# Patient Record
Sex: MALE
[De-identification: names, ages, dates, MRNs, and addresses within clinical notes are randomized per-mention and may not be internally consistent; named-entity substitution may affect disease eponyms.]

---

## 2022-07-26 ENCOUNTER — NURSE TRIAGE (OUTPATIENT)
Dept: OTHER | Facility: CLINIC | Age: 47
End: 2022-07-26

## 2022-07-27 NOTE — TELEPHONE ENCOUNTER
Subjective: Caller states \"cough\"     Current Symptoms:   + coughing with some blood, exp to covid  - sob, wheezing    Onset:     1 day; every 5-10 mins    Pain Severity:   Chest tightness 2/10    Temperature:    None     What has been tried:   Ibuprofen     Recommended disposition: See PCP within 24 Hours    Care advice provided, patient verbalizes understanding; denies any other questions or concerns; instructed to call back for any new or worsening symptoms. Patient/caller agrees to follow-up with PCP     This triage is a result of a call to [address] [place] [address]. Please do not respond to the triage nurse through this encounter. Any subsequent communication should be directly with the patient.     Reason for Disposition   [1] COVID-19 infection suspected by caller or triager AND [2] mild symptoms (cough, fever, or others) AND [3] negative COVID-19 rapid test    Protocols used: Coronavirus (COVID-19) Diagnosed or Suspected-ADULT-

## 2023-04-05 DIAGNOSIS — I10 HYPERTENSION, ESSENTIAL, BENIGN: Primary | ICD-10-CM

## 2023-04-05 RX ORDER — LISINOPRIL 40 MG/1
40 TABLET ORAL DAILY
COMMUNITY
End: 2023-04-05 | Stop reason: SDUPTHER

## 2023-04-05 RX ORDER — LISINOPRIL 40 MG/1
40 TABLET ORAL DAILY
Qty: 90 TABLET | Refills: 1 | Status: SHIPPED | OUTPATIENT
Start: 2023-04-05 | End: 2023-07-06 | Stop reason: SDUPTHER

## 2023-05-22 DIAGNOSIS — I10 HYPERTENSION, ESSENTIAL, BENIGN: Primary | ICD-10-CM

## 2023-05-22 RX ORDER — AMLODIPINE BESYLATE 10 MG/1
10 TABLET ORAL DAILY
Qty: 90 TABLET | Refills: 1 | Status: SHIPPED | OUTPATIENT
Start: 2023-05-22 | End: 2023-10-24

## 2023-05-22 RX ORDER — AMLODIPINE BESYLATE 10 MG/1
10 TABLET ORAL DAILY
COMMUNITY
Start: 2023-05-21 | End: 2023-05-22 | Stop reason: SDUPTHER

## 2023-05-25 ENCOUNTER — TELEPHONE (OUTPATIENT)
Dept: PRIMARY CARE | Facility: CLINIC | Age: 48
End: 2023-05-25
Payer: COMMERCIAL

## 2023-05-25 NOTE — TELEPHONE ENCOUNTER
Pt scheduled. Pt would also like to know if Dr. Jaquez can do anything about his cold symptoms that he has now or recommend what he should do. Pt prefers [facility] in [facility].

## 2023-05-25 NOTE — TELEPHONE ENCOUNTER
Pt states that he is sick with flu like symptoms. He is requesting to change his appointment with Dr. Jaquez to a phone appointment instead. He is available to do this today or tomorrow. Please advise.   [phone]

## 2023-05-26 ENCOUNTER — APPOINTMENT (OUTPATIENT)
Dept: PRIMARY CARE | Facility: CLINIC | Age: 48
End: 2023-05-26
Payer: COMMERCIAL

## 2023-06-22 ENCOUNTER — LAB (OUTPATIENT)
Dept: LAB | Facility: LAB | Age: 48
End: 2023-06-22
Payer: COMMERCIAL

## 2023-06-22 ENCOUNTER — OFFICE VISIT (OUTPATIENT)
Dept: PRIMARY CARE | Facility: CLINIC | Age: 48
End: 2023-06-22
Payer: COMMERCIAL

## 2023-06-22 VITALS
DIASTOLIC BLOOD PRESSURE: 78 MMHG | HEART RATE: 63 BPM | OXYGEN SATURATION: 98 % | SYSTOLIC BLOOD PRESSURE: 124 MMHG | HEIGHT: 71 IN | BODY MASS INDEX: 40.04 KG/M2 | WEIGHT: 286 LBS | TEMPERATURE: 97.3 F | RESPIRATION RATE: 18 BRPM

## 2023-06-22 DIAGNOSIS — R68.82 LOW LIBIDO: ICD-10-CM

## 2023-06-22 DIAGNOSIS — Z00.00 HEALTH CARE MAINTENANCE: Primary | ICD-10-CM

## 2023-06-22 DIAGNOSIS — E55.9 VITAMIN D DEFICIENCY: ICD-10-CM

## 2023-06-22 DIAGNOSIS — I10 HYPERTENSION, ESSENTIAL, BENIGN: ICD-10-CM

## 2023-06-22 DIAGNOSIS — R53.83 FATIGUE, UNSPECIFIED TYPE: ICD-10-CM

## 2023-06-22 DIAGNOSIS — E66.01 CLASS 2 SEVERE OBESITY DUE TO EXCESS CALORIES WITH SERIOUS COMORBIDITY AND BODY MASS INDEX (BMI) OF 39.0 TO 39.9 IN ADULT (MULTI): ICD-10-CM

## 2023-06-22 DIAGNOSIS — Z00.00 HEALTH CARE MAINTENANCE: ICD-10-CM

## 2023-06-22 DIAGNOSIS — G47.33 OBSTRUCTIVE SLEEP APNEA SYNDROME: ICD-10-CM

## 2023-06-22 DIAGNOSIS — K21.9 GASTROESOPHAGEAL REFLUX DISEASE, UNSPECIFIED WHETHER ESOPHAGITIS PRESENT: ICD-10-CM

## 2023-06-22 PROBLEM — E78.5 HYPERLIPIDEMIA: Status: ACTIVE | Noted: 2023-06-22

## 2023-06-22 PROBLEM — J45.20 MILD INTERMITTENT ASTHMA WITHOUT COMPLICATION (HHS-HCC): Status: ACTIVE | Noted: 2017-09-06

## 2023-06-22 PROBLEM — E78.1 HYPERTRIGLYCERIDEMIA: Status: ACTIVE | Noted: 2023-06-22

## 2023-06-22 PROBLEM — E66.812 CLASS 2 SEVERE OBESITY DUE TO EXCESS CALORIES WITH SERIOUS COMORBIDITY AND BODY MASS INDEX (BMI) OF 39.0 TO 39.9 IN ADULT: Status: ACTIVE | Noted: 2023-06-22

## 2023-06-22 LAB
ALANINE AMINOTRANSFERASE (SGPT) (U/L) IN SER/PLAS: 20 U/L (ref 10–52)
ALBUMIN (G/DL) IN SER/PLAS: 4.4 G/DL (ref 3.4–5)
ALKALINE PHOSPHATASE (U/L) IN SER/PLAS: 53 U/L (ref 33–120)
ANION GAP IN SER/PLAS: 13 MMOL/L (ref 10–20)
ASPARTATE AMINOTRANSFERASE (SGOT) (U/L) IN SER/PLAS: 19 U/L (ref 9–39)
BASOPHILS (10*3/UL) IN BLOOD BY AUTOMATED COUNT: 0.05 X10E9/L (ref 0–0.1)
BASOPHILS/100 LEUKOCYTES IN BLOOD BY AUTOMATED COUNT: 0.8 % (ref 0–2)
BILIRUBIN TOTAL (MG/DL) IN SER/PLAS: 0.5 MG/DL (ref 0–1.2)
CALCIDIOL (25 OH VITAMIN D3) (NG/ML) IN SER/PLAS: 50 NG/ML
CALCIUM (MG/DL) IN SER/PLAS: 9.4 MG/DL (ref 8.6–10.3)
CARBON DIOXIDE, TOTAL (MMOL/L) IN SER/PLAS: 26 MMOL/L (ref 21–32)
CHLORIDE (MMOL/L) IN SER/PLAS: 107 MMOL/L (ref 98–107)
CHOLESTEROL (MG/DL) IN SER/PLAS: 208 MG/DL (ref 0–199)
CHOLESTEROL IN HDL (MG/DL) IN SER/PLAS: 49.5 MG/DL
CHOLESTEROL/HDL RATIO: 4.2
CREATININE (MG/DL) IN SER/PLAS: 0.98 MG/DL (ref 0.5–1.3)
EOSINOPHILS (10*3/UL) IN BLOOD BY AUTOMATED COUNT: 0.11 X10E9/L (ref 0–0.7)
EOSINOPHILS/100 LEUKOCYTES IN BLOOD BY AUTOMATED COUNT: 1.8 % (ref 0–6)
ERYTHROCYTE DISTRIBUTION WIDTH (RATIO) BY AUTOMATED COUNT: 13.1 % (ref 11.5–14.5)
ERYTHROCYTE MEAN CORPUSCULAR HEMOGLOBIN CONCENTRATION (G/DL) BY AUTOMATED: 32.5 G/DL (ref 32–36)
ERYTHROCYTE MEAN CORPUSCULAR VOLUME (FL) BY AUTOMATED COUNT: 83 FL (ref 80–100)
ERYTHROCYTES (10*6/UL) IN BLOOD BY AUTOMATED COUNT: 5.2 X10E12/L (ref 4.5–5.9)
GFR MALE: >90 ML/MIN/1.73M2
GLUCOSE (MG/DL) IN SER/PLAS: 92 MG/DL (ref 74–99)
HEMATOCRIT (%) IN BLOOD BY AUTOMATED COUNT: 43.1 % (ref 41–52)
HEMOGLOBIN (G/DL) IN BLOOD: 14 G/DL (ref 13.5–17.5)
IMMATURE GRANULOCYTES/100 LEUKOCYTES IN BLOOD BY AUTOMATED COUNT: 0.2 % (ref 0–0.9)
LDL: 136 MG/DL (ref 0–99)
LEUKOCYTES (10*3/UL) IN BLOOD BY AUTOMATED COUNT: 6.1 X10E9/L (ref 4.4–11.3)
LYMPHOCYTES (10*3/UL) IN BLOOD BY AUTOMATED COUNT: 1.53 X10E9/L (ref 1.2–4.8)
LYMPHOCYTES/100 LEUKOCYTES IN BLOOD BY AUTOMATED COUNT: 25.1 % (ref 13–44)
MONOCYTES (10*3/UL) IN BLOOD BY AUTOMATED COUNT: 0.56 X10E9/L (ref 0.1–1)
MONOCYTES/100 LEUKOCYTES IN BLOOD BY AUTOMATED COUNT: 9.2 % (ref 2–10)
NEUTROPHILS (10*3/UL) IN BLOOD BY AUTOMATED COUNT: 3.84 X10E9/L (ref 1.2–7.7)
NEUTROPHILS/100 LEUKOCYTES IN BLOOD BY AUTOMATED COUNT: 62.9 % (ref 40–80)
PLATELETS (10*3/UL) IN BLOOD AUTOMATED COUNT: 264 X10E9/L (ref 150–450)
POC APPEARANCE, URINE: CLEAR
POC BILIRUBIN, URINE: NEGATIVE
POC BLOOD, URINE: NEGATIVE
POC COLOR, URINE: YELLOW
POC GLUCOSE, URINE: NEGATIVE MG/DL
POC KETONES, URINE: NEGATIVE MG/DL
POC LEUKOCYTES, URINE: NEGATIVE
POC NITRITE,URINE: NEGATIVE
POC PH, URINE: 6.5 PH
POC PROTEIN, URINE: NEGATIVE MG/DL
POC SPECIFIC GRAVITY, URINE: 1.01
POC UROBILINOGEN, URINE: 0.2 EU/DL
POTASSIUM (MMOL/L) IN SER/PLAS: 4.2 MMOL/L (ref 3.5–5.3)
PROTEIN TOTAL: 6.5 G/DL (ref 6.4–8.2)
SODIUM (MMOL/L) IN SER/PLAS: 142 MMOL/L (ref 136–145)
TESTOSTERONE (NG/DL) IN SER/PLAS: 260 NG/DL (ref 240–1000)
THYROTROPIN (MIU/L) IN SER/PLAS BY DETECTION LIMIT <= 0.05 MIU/L: 2 MIU/L (ref 0.44–3.98)
TRIGLYCERIDE (MG/DL) IN SER/PLAS: 111 MG/DL (ref 0–149)
UREA NITROGEN (MG/DL) IN SER/PLAS: 17 MG/DL (ref 6–23)
VLDL: 22 MG/DL (ref 0–40)

## 2023-06-22 PROCEDURE — 1036F TOBACCO NON-USER: CPT | Performed by: FAMILY MEDICINE

## 2023-06-22 PROCEDURE — 83036 HEMOGLOBIN GLYCOSYLATED A1C: CPT

## 2023-06-22 PROCEDURE — 3008F BODY MASS INDEX DOCD: CPT | Performed by: FAMILY MEDICINE

## 2023-06-22 PROCEDURE — 84443 ASSAY THYROID STIM HORMONE: CPT

## 2023-06-22 PROCEDURE — 36415 COLL VENOUS BLD VENIPUNCTURE: CPT

## 2023-06-22 PROCEDURE — 93000 ELECTROCARDIOGRAM COMPLETE: CPT | Performed by: FAMILY MEDICINE

## 2023-06-22 PROCEDURE — 84403 ASSAY OF TOTAL TESTOSTERONE: CPT

## 2023-06-22 PROCEDURE — 3078F DIAST BP <80 MM HG: CPT | Performed by: FAMILY MEDICINE

## 2023-06-22 PROCEDURE — 99396 PREV VISIT EST AGE 40-64: CPT | Performed by: FAMILY MEDICINE

## 2023-06-22 PROCEDURE — 80061 LIPID PANEL: CPT

## 2023-06-22 PROCEDURE — 81002 URINALYSIS NONAUTO W/O SCOPE: CPT | Performed by: FAMILY MEDICINE

## 2023-06-22 PROCEDURE — 80053 COMPREHEN METABOLIC PANEL: CPT

## 2023-06-22 PROCEDURE — 3074F SYST BP LT 130 MM HG: CPT | Performed by: FAMILY MEDICINE

## 2023-06-22 PROCEDURE — 85025 COMPLETE CBC W/AUTO DIFF WBC: CPT

## 2023-06-22 PROCEDURE — 82306 VITAMIN D 25 HYDROXY: CPT

## 2023-06-22 RX ORDER — ALBUTEROL SULFATE 90 UG/1
AEROSOL, METERED RESPIRATORY (INHALATION)
COMMUNITY
Start: 2022-07-27

## 2023-06-22 RX ORDER — FOLIC ACID 0.8 MG
1 TABLET ORAL DAILY
COMMUNITY

## 2023-06-22 RX ORDER — OMEPRAZOLE 20 MG/1
CAPSULE, DELAYED RELEASE ORAL
COMMUNITY
Start: 2017-06-11 | End: 2023-10-24

## 2023-06-22 RX ORDER — CHOLECALCIFEROL (VITAMIN D3) 50 MCG
2 TABLET ORAL DAILY
COMMUNITY
Start: 2020-05-11

## 2023-06-22 ASSESSMENT — PATIENT HEALTH QUESTIONNAIRE - PHQ9
10. IF YOU CHECKED OFF ANY PROBLEMS, HOW DIFFICULT HAVE THESE PROBLEMS MADE IT FOR YOU TO DO YOUR WORK, TAKE CARE OF THINGS AT HOME, OR GET ALONG WITH OTHER PEOPLE: NOT DIFFICULT AT ALL
SUM OF ALL RESPONSES TO PHQ9 QUESTIONS 1 AND 2: 1
2. FEELING DOWN, DEPRESSED OR HOPELESS: SEVERAL DAYS
1. LITTLE INTEREST OR PLEASURE IN DOING THINGS: NOT AT ALL

## 2023-06-22 ASSESSMENT — ENCOUNTER SYMPTOMS
SHORTNESS OF BREATH: 0
HEADACHES: 0
DEPRESSION: 0
LOSS OF SENSATION IN FEET: 0
OCCASIONAL FEELINGS OF UNSTEADINESS: 0

## 2023-06-22 NOTE — PROGRESS NOTES
"Subjective     Darwin Frost is a 47 y.o. male who presents for Annual Exam.    HPI     Pt is doing well.  He is here for his annual exam.  He is physically active.     He has HTN , on amlodipine and lisinopril.      He has RAJAT , on cpap.      He has GERD, well controlled on PPI daily.     Review of Systems   Respiratory:  Negative for shortness of breath.    Cardiovascular:  Negative for chest pain.   Neurological:  Negative for headaches.       Objective     Vitals:    06/22/23 0858   BP: 124/78   BP Location: Left arm   Patient Position: Sitting   Pulse: 63   Resp: 18   Temp: 36.3 °C (97.3 °F)   TempSrc: Temporal   SpO2: 98%   Weight: 130 kg (286 lb)   Height: 1.803 m (5' 11\")        Current Outpatient Medications   Medication Instructions    albuterol 90 mcg/actuation inhaler inhalation    amLODIPine (NORVASC) 10 mg, oral, Daily    cholecalciferol (Vitamin D-3) 50 MCG (2000 UT) tablet 2 tablets, oral, Daily    folic acid (Folvite) 800 mcg tablet 1 tablet, oral, Daily    lisinopril 40 mg, oral, Daily    omeprazole (PriLOSEC) 20 mg DR capsule         Past Surgical History:   Procedure Laterality Date    OTHER SURGICAL HISTORY  05/28/2021    Vasectomy    OTHER SURGICAL HISTORY  05/24/2022    Colonoscopy        Social History     Tobacco Use    Smoking status: Never    Smokeless tobacco: Never   Vaping Use    Vaping Use: Never used        No family history on file.     Immunization History   Administered Date(s) Administered    DTP / HiB 01/01/1976    Hep B, adult 11/18/2014, 05/26/2015    MMR 10/01/1976, 03/01/1977    Moderna SARS-CoV-2 Vaccination 03/24/2021, 04/29/2021, 01/03/2022    Pfizer Sars-cov-2 Bivalent 30 mcg/0.3 mL 12/01/2022    Pneumococcal Conjugate PCV 13 11/18/2014    TD (adult), 2 Lf tetanus toxoid, preservative free, adsorbed 01/01/1980    Tdap 05/03/2007, 11/18/2014, 05/11/2020    Varicella 12/01/1976        Physical Exam  Vitals reviewed.   Constitutional:       General: He is not in acute " distress.     Appearance: Normal appearance. He is obese.   HENT:      Head: Normocephalic and atraumatic.      Right Ear: Tympanic membrane, ear canal and external ear normal.      Left Ear: Tympanic membrane, ear canal and external ear normal.      Nose: Nose normal.      Mouth/Throat:      Mouth: Mucous membranes are moist.      Pharynx: Oropharynx is clear. No oropharyngeal exudate or posterior oropharyngeal erythema.   Eyes:      Extraocular Movements: Extraocular movements intact.      Pupils: Pupils are equal, round, and reactive to light.   Neck:      Thyroid: No thyroid mass or thyromegaly.   Cardiovascular:      Rate and Rhythm: Normal rate and regular rhythm.      Heart sounds: No murmur heard.  Pulmonary:      Effort: Pulmonary effort is normal. No respiratory distress.      Breath sounds: Normal breath sounds. No wheezing, rhonchi or rales.   Abdominal:      General: Abdomen is flat. There is no distension.      Palpations: Abdomen is soft.      Tenderness: There is no abdominal tenderness.   Genitourinary:     Testes: Normal.   Musculoskeletal:         General: Normal range of motion.   Lymphadenopathy:      Cervical: No cervical adenopathy.   Skin:     General: Skin is warm and dry.      Findings: No rash.   Neurological:      General: No focal deficit present.      Mental Status: He is alert and oriented to person, place, and time. Mental status is at baseline.   Psychiatric:         Mood and Affect: Mood normal.         Behavior: Behavior normal.         Problem List Items Addressed This Visit       Health care maintenance - Primary    Relevant Orders    POCT UA (nonautomated w/o microscopy) manually resulted (Completed)    ECG 12 lead (Clinic Performed) (Completed)    Comprehensive Metabolic Panel    Lipid Panel    CBC and Auto Differential    Hemoglobin A1C    Hypertension, essential, benign    Vitamin D deficiency    Relevant Orders    Vitamin D, Total    Obstructive sleep apnea syndrome    GERD  (gastroesophageal reflux disease)    Class 2 severe obesity due to excess calories with serious comorbidity and body mass index (BMI) of 39.0 to 39.9 in adult (CMS/[facility])     Other Visit Diagnoses       Low libido        Relevant Orders    Testosterone    TSH with reflex to Free T4 if abnormal    Fatigue, unspecified type        Relevant Orders    Testosterone    TSH with reflex to Free T4 if abnormal            Assessment/Plan     Well Exam     Colon screening - utd with colonoscopy 9/20/21-repeat 5 years     Vaccines - utd with tdap.    Hx of recurrent pneumonia - had prevnar 13 vaccine in 2014.  No recent bouts of pneumonia.      I recommend yearly flu vaccine and routine COVID vaccinations as indicated     Complete labs    Obesity- Healthy diet, routine exercise was discussed with patient  - pt declined referral to dietitian.    HTN - controlled    RAJAT - on cpap nightly    Fatigue, low libido, check testosterone, TSH.    Follow up in 4 months for recheck on HTN, weight.

## 2023-06-23 LAB
ESTIMATED AVERAGE GLUCOSE FOR HBA1C: 111 MG/DL
HEMOGLOBIN A1C/HEMOGLOBIN TOTAL IN BLOOD: 5.5 %

## 2023-06-27 DIAGNOSIS — E78.5 HYPERLIPIDEMIA, MILD: Primary | ICD-10-CM

## 2023-06-27 DIAGNOSIS — R68.82 LOW LIBIDO: Primary | ICD-10-CM

## 2023-06-28 DIAGNOSIS — R79.89 LOW TESTOSTERONE: Primary | ICD-10-CM

## 2023-07-06 DIAGNOSIS — I10 HYPERTENSION, ESSENTIAL, BENIGN: ICD-10-CM

## 2023-07-06 RX ORDER — LISINOPRIL 40 MG/1
40 TABLET ORAL DAILY
Qty: 90 TABLET | Refills: 3 | Status: SHIPPED | OUTPATIENT
Start: 2023-07-06

## 2023-08-11 LAB
ERYTHROCYTE DISTRIBUTION WIDTH (RATIO) BY AUTOMATED COUNT: 13 % (ref 11.5–14.5)
ERYTHROCYTE MEAN CORPUSCULAR HEMOGLOBIN CONCENTRATION (G/DL) BY AUTOMATED: 32.1 G/DL (ref 32–36)
ERYTHROCYTE MEAN CORPUSCULAR VOLUME (FL) BY AUTOMATED COUNT: 84 FL (ref 80–100)
ERYTHROCYTES (10*6/UL) IN BLOOD BY AUTOMATED COUNT: 4.94 X10E12/L (ref 4.5–5.9)
ESTRADIOL (PG/ML) IN SER/PLAS: 22 PG/ML
HEMATOCRIT (%) IN BLOOD BY AUTOMATED COUNT: 41.4 % (ref 41–52)
HEMOGLOBIN (G/DL) IN BLOOD: 13.3 G/DL (ref 13.5–17.5)
LEUKOCYTES (10*3/UL) IN BLOOD BY AUTOMATED COUNT: 5.8 X10E9/L (ref 4.4–11.3)
LUTEINIZING HORMONE (IU/ML) IN SER/PLAS: 2.6 IU/L
PLATELETS (10*3/UL) IN BLOOD AUTOMATED COUNT: 240 X10E9/L (ref 150–450)
PROLACTIN (UG/L) IN SER/PLAS: 11.1 UG/L (ref 2–18)
PROSTATE SPECIFIC AG (NG/ML) IN SER/PLAS: 0.66 NG/ML (ref 0–4)
TESTOSTERONE (NG/DL) IN SER/PLAS: 247 NG/DL (ref 240–1000)

## 2023-09-29 PROBLEM — G47.33 OSA ON CPAP: Status: ACTIVE | Noted: 2023-09-29

## 2023-09-29 PROBLEM — Q25.79 PULMONARY ARTERY ABNORMALITY (HHS-HCC): Status: ACTIVE | Noted: 2023-09-29

## 2023-09-29 PROBLEM — L08.9 INFECTED SEBACEOUS CYST: Status: ACTIVE | Noted: 2023-09-29

## 2023-09-29 PROBLEM — R91.8 ABNORMAL CT LUNG SCREENING: Status: ACTIVE | Noted: 2023-09-29

## 2023-09-29 PROBLEM — L72.3 INFECTED SEBACEOUS CYST: Status: ACTIVE | Noted: 2023-09-29

## 2023-09-29 PROBLEM — E29.1 HYPOGONADISM IN MALE: Status: ACTIVE | Noted: 2023-09-29

## 2023-09-29 PROBLEM — R35.89 POLYURIA: Status: ACTIVE | Noted: 2023-09-29

## 2023-09-29 PROBLEM — R07.89 ATYPICAL CHEST PAIN: Status: ACTIVE | Noted: 2023-09-29

## 2023-09-29 LAB — TESTOSTERONE (NG/DL) IN SER/PLAS: 719 NG/DL (ref 240–1000)

## 2023-09-29 RX ORDER — OMEPRAZOLE 20 MG/1
1 TABLET, ORALLY DISINTEGRATING, DELAYED RELEASE ORAL DAILY
COMMUNITY
Start: 2020-05-11 | End: 2024-04-15 | Stop reason: SDUPTHER

## 2023-09-29 RX ORDER — TESTOSTERONE CYPIONATE 200 MG/ML
0.5 INJECTION, SOLUTION INTRAMUSCULAR
COMMUNITY
End: 2023-10-05 | Stop reason: SDUPTHER

## 2023-09-29 RX ORDER — AMLODIPINE BESYLATE 5 MG/1
5 TABLET ORAL DAILY
COMMUNITY
Start: 2022-11-29 | End: 2023-10-24

## 2023-10-05 ENCOUNTER — OFFICE VISIT (OUTPATIENT)
Dept: UROLOGY | Facility: CLINIC | Age: 48
End: 2023-10-05
Payer: COMMERCIAL

## 2023-10-05 VITALS
DIASTOLIC BLOOD PRESSURE: 81 MMHG | SYSTOLIC BLOOD PRESSURE: 145 MMHG | HEART RATE: 65 BPM | BODY MASS INDEX: 38.74 KG/M2 | WEIGHT: 286 LBS | HEIGHT: 72 IN

## 2023-10-05 DIAGNOSIS — E29.1 HYPOGONADISM IN MALE: Primary | ICD-10-CM

## 2023-10-05 DIAGNOSIS — E29.1 TESTOSTERONE DEFICIENCY IN MALE: ICD-10-CM

## 2023-10-05 DIAGNOSIS — E29.1 HYPOGONADISM IN MALE: ICD-10-CM

## 2023-10-05 PROCEDURE — 3008F BODY MASS INDEX DOCD: CPT | Performed by: UROLOGY

## 2023-10-05 PROCEDURE — 3077F SYST BP >= 140 MM HG: CPT | Performed by: UROLOGY

## 2023-10-05 PROCEDURE — 99214 OFFICE O/P EST MOD 30 MIN: CPT | Performed by: UROLOGY

## 2023-10-05 PROCEDURE — 3079F DIAST BP 80-89 MM HG: CPT | Performed by: UROLOGY

## 2023-10-05 PROCEDURE — 1036F TOBACCO NON-USER: CPT | Performed by: UROLOGY

## 2023-10-05 RX ORDER — TESTOSTERONE CYPIONATE 200 MG/ML
100 INJECTION, SOLUTION INTRAMUSCULAR
Qty: 10 ML | Refills: 3 | Status: SHIPPED | OUTPATIENT
Start: 2023-10-05 | End: 2024-01-18 | Stop reason: SDUPTHER

## 2023-10-05 NOTE — PROGRESS NOTES
"PRIOR NOTES  48-year-old male referred for evaluation of low testosterone  PMH: RAJAT on CPAP, hypertension, hyperlipidemia  6/22/2023-testosterone at 9:38  ng/dL  Hemoglobin 14  Pt endorses: Depression, excessive fatigue  Sexual: Good erectile function, libido is okay, \"lowish\"  Non-sexual: fatigue, depression. Having difficulty w/ weight loss and muscle gain despite weightlifting and exercising x 1 yr  Using CPAP  Prior vasectomy  No prior chemotherapy, mumps, frequent hot tub use  No recreational drug use, or opioid use    UPDATED SUBJECTIVE HISTORY  10/05/23 - Pt reports better stamina, recovery, focus. No adverse affects he is aware of. Injecting thighs.     T 9/29/23 - 719    PSA 8/11/23 - 0.66  LH 2.6  T 8/11/23 - 247  E2 - 22    Past Medical History  He has a past medical history of Essential (primary) hypertension.    Surgical History  He has a past surgical history that includes Other surgical history (05/28/2021) and Other surgical history (05/24/2022).     Social History  He reports that he has never smoked. He has never used smokeless tobacco. No history on file for alcohol use and drug use.    Family History  Family History   Problem Relation Name Age of Onset    Heart murmur Father      Colon cancer Paternal Grandmother          Allergies  Patient has no known allergies.    ROS: 12 system review was completed and is negative with the exception of those signs and symptoms noted in the history of present illness: A 12 system review was completed and is negative with the exception of those signs and symptoms noted in the history of present illness.     Exam:  General: in NAD, appears stated age  Head: normocephalic, atraumatic  Respiratory: normal effort, no use of accessory muscles  Cardiovascular: no edema noted  Skin: normal turgor, no rashes  Neurologic: grossly intact, oriented to person/place/time  Psychiatric: mode and affect appropriate     Last Recorded Vitals  Blood pressure 145/81, pulse " 65, height 1.829 m (6'), weight 130 kg (286 lb).    Lab Results   Component Value Date    CREATININE 0.98 06/22/2023    HGB 13.3 (L) 08/11/2023         ASSESSMENT/PLAN:  #Hypogonadism  -Excellent subjective response thus far to testosterone cypionate 100 weekly  -Excellent lab results as well, testosterone is in range  -Repeat testosterone and CBC in 3 months    Curry Natarajan MD

## 2023-10-24 DIAGNOSIS — I10 HYPERTENSION, ESSENTIAL, BENIGN: Primary | ICD-10-CM

## 2023-10-24 RX ORDER — METOPROLOL SUCCINATE 25 MG/1
25 TABLET, EXTENDED RELEASE ORAL DAILY
Qty: 30 TABLET | Refills: 1 | Status: SHIPPED | OUTPATIENT
Start: 2023-10-24 | End: 2023-11-09 | Stop reason: DRUGHIGH

## 2023-11-08 ENCOUNTER — PATIENT MESSAGE (OUTPATIENT)
Dept: PRIMARY CARE | Facility: CLINIC | Age: 48
End: 2023-11-08
Payer: COMMERCIAL

## 2023-11-09 DIAGNOSIS — I10 HYPERTENSION, ESSENTIAL, BENIGN: ICD-10-CM

## 2023-11-09 RX ORDER — METOPROLOL SUCCINATE 25 MG/1
50 TABLET, EXTENDED RELEASE ORAL DAILY
Qty: 60 TABLET | Refills: 1 | Status: SHIPPED | COMMUNITY
Start: 2023-11-09 | End: 2023-11-13 | Stop reason: SDUPTHER

## 2023-11-13 DIAGNOSIS — I10 HYPERTENSION, ESSENTIAL, BENIGN: ICD-10-CM

## 2023-11-13 RX ORDER — METOPROLOL SUCCINATE 25 MG/1
50 TABLET, EXTENDED RELEASE ORAL DAILY
Qty: 60 TABLET | Refills: 2 | Status: SHIPPED | OUTPATIENT
Start: 2023-11-13 | End: 2023-12-11

## 2023-11-20 DIAGNOSIS — I10 HYPERTENSION, ESSENTIAL, BENIGN: Primary | ICD-10-CM

## 2023-11-20 RX ORDER — HYDROCHLOROTHIAZIDE 25 MG/1
25 TABLET ORAL DAILY
Qty: 30 TABLET | Refills: 1 | Status: SHIPPED | OUTPATIENT
Start: 2023-11-20 | End: 2023-12-14

## 2023-11-29 ENCOUNTER — OFFICE VISIT (OUTPATIENT)
Dept: PRIMARY CARE | Facility: CLINIC | Age: 48
End: 2023-11-29
Payer: COMMERCIAL

## 2023-11-29 VITALS
TEMPERATURE: 98.2 F | RESPIRATION RATE: 16 BRPM | SYSTOLIC BLOOD PRESSURE: 138 MMHG | HEIGHT: 71 IN | OXYGEN SATURATION: 97 % | BODY MASS INDEX: 39.7 KG/M2 | DIASTOLIC BLOOD PRESSURE: 86 MMHG | WEIGHT: 283.6 LBS | HEART RATE: 60 BPM

## 2023-11-29 DIAGNOSIS — I10 HYPERTENSION, UNCONTROLLED: ICD-10-CM

## 2023-11-29 DIAGNOSIS — I10 HYPERTENSION, ESSENTIAL, BENIGN: Primary | ICD-10-CM

## 2023-11-29 PROBLEM — Q25.79 PULMONARY ARTERY ABNORMALITY (HHS-HCC): Status: RESOLVED | Noted: 2023-09-29 | Resolved: 2023-11-29

## 2023-11-29 PROCEDURE — 3008F BODY MASS INDEX DOCD: CPT | Performed by: FAMILY MEDICINE

## 2023-11-29 PROCEDURE — 99214 OFFICE O/P EST MOD 30 MIN: CPT | Performed by: FAMILY MEDICINE

## 2023-11-29 PROCEDURE — 1036F TOBACCO NON-USER: CPT | Performed by: FAMILY MEDICINE

## 2023-11-29 PROCEDURE — 3075F SYST BP GE 130 - 139MM HG: CPT | Performed by: FAMILY MEDICINE

## 2023-11-29 PROCEDURE — 3079F DIAST BP 80-89 MM HG: CPT | Performed by: FAMILY MEDICINE

## 2023-11-29 RX ORDER — AMLODIPINE BESYLATE 5 MG/1
5 TABLET ORAL DAILY
Qty: 30 TABLET | Refills: 2 | Status: SHIPPED | OUTPATIENT
Start: 2023-11-29 | End: 2023-12-18

## 2023-11-29 NOTE — PROGRESS NOTES
"Subjective     Darwin Frost is a 48 y.o. male who presents for Follow-up (Medication.).    HPI     He is here today to follow up on his HTN.  He is on currently taking hydrochlorothiazide 25 mg daily (started on 11/22/23), metoprolol 25 mg TWO tabs daily, lisinopril 40 mg daily.  He was unable to tolerate amlodipine due to ankle swelling.  His recent home bp readings have been in the 130-150s systolic.  He reports his ankle edema has helped.     Review of Systems   Respiratory:  Negative for shortness of breath.    Cardiovascular:  Negative for chest pain.   Neurological:  Negative for headaches.       Objective     Vitals:    11/29/23 1523 11/29/23 1533 11/29/23 1620 11/29/23 1625   BP: (!) 170/98 (!) 163/97 142/88 138/86   BP Location: Left arm Left arm     Patient Position: Sitting Sitting     Pulse: 60      Resp: 16      Temp: 36.8 °C (98.2 °F)      SpO2: 97%      Weight: 129 kg (283 lb 9.6 oz)      Height: 1.803 m (5' 11\")           Current Outpatient Medications   Medication Instructions    albuterol 90 mcg/actuation inhaler inhalation    amLODIPine (NORVASC) 5 mg, oral, Daily    cholecalciferol (Vitamin D-3) 50 MCG (2000 UT) tablet 2 tablets, oral, Daily    docosahexaenoic acid/epa (FISH OIL ORAL) oral    folic acid (Folvite) 800 mcg tablet 1 tablet, oral, Daily    hydroCHLOROthiazide (HYDRODIURIL) 25 mg, oral, Daily    lisinopril 40 mg, oral, Daily    metoprolol succinate XL (TOPROL-XL) 50 mg, oral, Daily, Do not crush or chew.    omeprazole 20 mg tablet,disintegrat, delay rel 1 tablet, oral, Daily    testosterone cypionate (DEPO-TESTOSTERONE) 100 mg, intramuscular, Weekly        Past Surgical History:   Procedure Laterality Date    OTHER SURGICAL HISTORY  05/28/2021    Vasectomy    OTHER SURGICAL HISTORY  05/24/2022    Colonoscopy        Social History     Tobacco Use    Smoking status: Never    Smokeless tobacco: Never   Vaping Use    Vaping Use: Never used        Family History   Problem Relation Name Age " of Onset    Heart murmur Father      Colon cancer Paternal Grandmother          Immunization History   Administered Date(s) Administered    DTP / HiB 01/01/1976    Flu vaccine (IIV4), preservative free *Check age/dose* 11/18/2020, 10/30/2021, 11/01/2022    Hepatitis B vaccine, adult (RECOMBIVAX, ENGERIX) 11/18/2014, 05/26/2015    Influenza, seasonal, injectable 11/18/2014    MMR vaccine, subcutaneous (MMR II) 10/01/1976, 03/01/1977    Moderna SARS-CoV-2 Vaccination 03/24/2021, 04/29/2021, 01/03/2022    Pfizer COVID-19 vaccine, bivalent, age 12 years and older (30 mcg/0.3 mL) 12/01/2022    Pneumococcal conjugate vaccine, 13-valent (PREVNAR 13) 11/18/2014    Td vaccine, age 7 years and older (TDVAX) 01/01/1980    Tdap vaccine, age 7 year and older (BOOSTRIX) 05/03/2007, 11/18/2014, 05/11/2020    Varicella vaccine, subcutaneous (VARIVAX) 12/01/1976        Physical Exam  Vitals reviewed.   Constitutional:       General: He is not in acute distress.     Appearance: Normal appearance. He is well-developed. He is obese.   HENT:      Head: Normocephalic and atraumatic.   Eyes:      General: Lids are normal.      Conjunctiva/sclera:      Right eye: Right conjunctiva is not injected.      Left eye: Left conjunctiva is not injected.   Cardiovascular:      Rate and Rhythm: Normal rate and regular rhythm.      Heart sounds: No murmur heard.  Pulmonary:      Effort: Pulmonary effort is normal. No respiratory distress.      Breath sounds: Normal breath sounds. No wheezing, rhonchi or rales.   Skin:     General: Skin is warm and dry.      Findings: No rash.   Neurological:      Mental Status: He is alert and oriented to person, place, and time. Mental status is at baseline.   Psychiatric:         Mood and Affect: Mood normal.         Behavior: Behavior normal.         Problem List Items Addressed This Visit       Hypertension, essential, benign - Primary    Relevant Medications    amLODIPine (Norvasc) 5 mg tablet     Other Visit  Diagnoses       Hypertension, uncontrolled        Relevant Orders    Vascular UsSRenal Artery Duplex Complete    US renal complete            Assessment/Plan     Hypertension, not adequately controlled - continue current medications and restart amlodipine 5 mg daily.  If LE edema occurs please let me know.  Patient was instructed to record blood pressures (using an arm BP monitor) at home 1-2 times per day (per AHA guidelines) and to follow up in office for a blood pressure recheck in 4-6 weeks.  I also encouraged low-sodium diet and regular exercise.  I also discussed with patient the importance of good blood pressure control to avoid long-term complications such as heart attack and stroke.     I will also check kidney ultrasound and renal artery ultrasound due to difficult to treat HTN     Hypogonadism - treated with testosterone replacement by  urology.     Due for lipid screening - pt aware.      Follow up 4-6 weeks

## 2023-11-30 ASSESSMENT — ENCOUNTER SYMPTOMS
SHORTNESS OF BREATH: 0
HEADACHES: 0

## 2023-12-04 ENCOUNTER — ANCILLARY PROCEDURE (OUTPATIENT)
Dept: RADIOLOGY | Facility: CLINIC | Age: 48
End: 2023-12-04
Payer: COMMERCIAL

## 2023-12-04 ENCOUNTER — APPOINTMENT (OUTPATIENT)
Dept: RADIOLOGY | Facility: CLINIC | Age: 48
End: 2023-12-04
Payer: COMMERCIAL

## 2023-12-04 DIAGNOSIS — I10 HYPERTENSION, UNCONTROLLED: ICD-10-CM

## 2023-12-04 PROCEDURE — 76770 US EXAM ABDO BACK WALL COMP: CPT | Performed by: STUDENT IN AN ORGANIZED HEALTH CARE EDUCATION/TRAINING PROGRAM

## 2023-12-04 PROCEDURE — 93975 VASCULAR STUDY: CPT | Performed by: STUDENT IN AN ORGANIZED HEALTH CARE EDUCATION/TRAINING PROGRAM

## 2023-12-04 PROCEDURE — 93975 VASCULAR STUDY: CPT

## 2023-12-04 PROCEDURE — 76770 US EXAM ABDO BACK WALL COMP: CPT | Mod: 59

## 2023-12-07 DIAGNOSIS — I10 HYPERTENSION, ESSENTIAL, BENIGN: ICD-10-CM

## 2023-12-11 RX ORDER — METOPROLOL SUCCINATE 25 MG/1
50 TABLET, EXTENDED RELEASE ORAL DAILY
Qty: 180 TABLET | Refills: 3 | Status: SHIPPED | OUTPATIENT
Start: 2023-12-11 | End: 2024-01-22

## 2023-12-13 DIAGNOSIS — I10 HYPERTENSION, ESSENTIAL, BENIGN: ICD-10-CM

## 2023-12-14 RX ORDER — HYDROCHLOROTHIAZIDE 25 MG/1
25 TABLET ORAL DAILY
Qty: 90 TABLET | Refills: 3 | Status: SHIPPED | OUTPATIENT
Start: 2023-12-14 | End: 2024-01-22

## 2023-12-17 DIAGNOSIS — I10 HYPERTENSION, ESSENTIAL, BENIGN: ICD-10-CM

## 2023-12-18 DIAGNOSIS — I10 HYPERTENSION, ESSENTIAL, BENIGN: ICD-10-CM

## 2023-12-18 RX ORDER — HYDROCHLOROTHIAZIDE 25 MG/1
25 TABLET ORAL DAILY
Qty: 90 TABLET | Refills: 3 | OUTPATIENT
Start: 2023-12-18

## 2023-12-18 RX ORDER — AMLODIPINE BESYLATE 5 MG/1
5 TABLET ORAL DAILY
Qty: 90 TABLET | Refills: 3 | Status: SHIPPED | OUTPATIENT
Start: 2023-12-18 | End: 2024-01-22

## 2024-01-11 ENCOUNTER — LAB (OUTPATIENT)
Dept: LAB | Facility: LAB | Age: 49
End: 2024-01-11
Payer: COMMERCIAL

## 2024-01-11 DIAGNOSIS — E78.5 HYPERLIPIDEMIA, MILD: ICD-10-CM

## 2024-01-11 DIAGNOSIS — E29.1 HYPOGONADISM IN MALE: ICD-10-CM

## 2024-01-11 LAB
CHOLEST SERPL-MCNC: 182 MG/DL (ref 0–199)
CHOLESTEROL/HDL RATIO: 4.7
ERYTHROCYTE [DISTWIDTH] IN BLOOD BY AUTOMATED COUNT: 14.2 % (ref 11.5–14.5)
HCT VFR BLD AUTO: 49.5 % (ref 41–52)
HDLC SERPL-MCNC: 38.7 MG/DL
HGB BLD-MCNC: 16.1 G/DL (ref 13.5–17.5)
LDLC SERPL CALC-MCNC: 121 MG/DL
MCH RBC QN AUTO: 26.1 PG (ref 26–34)
MCHC RBC AUTO-ENTMCNC: 32.5 G/DL (ref 32–36)
MCV RBC AUTO: 80 FL (ref 80–100)
NON HDL CHOLESTEROL: 143 MG/DL (ref 0–149)
NRBC BLD-RTO: 0 /100 WBCS (ref 0–0)
PLATELET # BLD AUTO: 285 X10*3/UL (ref 150–450)
RBC # BLD AUTO: 6.16 X10*6/UL (ref 4.5–5.9)
TESTOST SERPL-MCNC: 781 NG/DL (ref 240–1000)
TRIGL SERPL-MCNC: 113 MG/DL (ref 0–149)
VLDL: 23 MG/DL (ref 0–40)
WBC # BLD AUTO: 6.9 X10*3/UL (ref 4.4–11.3)

## 2024-01-11 PROCEDURE — 85027 COMPLETE CBC AUTOMATED: CPT

## 2024-01-11 PROCEDURE — 84403 ASSAY OF TOTAL TESTOSTERONE: CPT

## 2024-01-11 PROCEDURE — 36415 COLL VENOUS BLD VENIPUNCTURE: CPT

## 2024-01-11 PROCEDURE — 80061 LIPID PANEL: CPT

## 2024-01-18 ENCOUNTER — OFFICE VISIT (OUTPATIENT)
Dept: UROLOGY | Facility: CLINIC | Age: 49
End: 2024-01-18
Payer: COMMERCIAL

## 2024-01-18 VITALS
RESPIRATION RATE: 16 BRPM | WEIGHT: 284.17 LBS | DIASTOLIC BLOOD PRESSURE: 82 MMHG | BODY MASS INDEX: 38.49 KG/M2 | HEIGHT: 72 IN | HEART RATE: 64 BPM | SYSTOLIC BLOOD PRESSURE: 164 MMHG

## 2024-01-18 DIAGNOSIS — E29.1 HYPOGONADISM IN MALE: ICD-10-CM

## 2024-01-18 PROCEDURE — 1036F TOBACCO NON-USER: CPT | Performed by: UROLOGY

## 2024-01-18 PROCEDURE — 3008F BODY MASS INDEX DOCD: CPT | Performed by: UROLOGY

## 2024-01-18 PROCEDURE — 99214 OFFICE O/P EST MOD 30 MIN: CPT | Performed by: UROLOGY

## 2024-01-18 PROCEDURE — 3077F SYST BP >= 140 MM HG: CPT | Performed by: UROLOGY

## 2024-01-18 PROCEDURE — 3079F DIAST BP 80-89 MM HG: CPT | Performed by: UROLOGY

## 2024-01-18 RX ORDER — TESTOSTERONE CYPIONATE 200 MG/ML
100 INJECTION, SOLUTION INTRAMUSCULAR
Qty: 10 ML | Refills: 3 | Status: SHIPPED | OUTPATIENT
Start: 2024-01-18

## 2024-01-18 ASSESSMENT — PAIN SCALES - GENERAL: PAINLEVEL: 0-NO PAIN

## 2024-01-18 NOTE — PROGRESS NOTES
"PRIOR NOTES  48-year-old male referred for evaluation of low testosterone  PMH: RAJAT on CPAP, hypertension, hyperlipidemia  6/22/2023-testosterone at 9:38  ng/dL  Hemoglobin 14  Pt endorses: Depression, excessive fatigue  Sexual: Good erectile function, libido is okay, \"lowish\"  Non-sexual: fatigue, depression. Having difficulty w/ weight loss and muscle gain despite weightlifting and exercising x 1 yr  Using CPAP  Prior vasectomy  No prior chemotherapy, mumps, frequent hot tub use  No recreational drug use, or opioid use    10/05/23 - Pt reports better stamina, recovery, focus. No adverse affects he is aware of. Injecting thighs.   T 9/29/23 - 719  PSA 8/11/23 - 0.66  LH 2.6    1/11/24 - T 781  Hgb 16.1    UPDATED SUBJECTIVE HISTORY  01/18/24 - Reports continued benefit from TRT w/r/t focus, energy, recovery. BP has been a little on the high side.     Past Medical History  He has a past medical history of Essential (primary) hypertension.    Surgical History  He has a past surgical history that includes Other surgical history (05/28/2021) and Other surgical history (05/24/2022).     Social History  He reports that he has never smoked. He has never used smokeless tobacco. No history on file for alcohol use and drug use.    Family History  Family History   Problem Relation Name Age of Onset    Heart murmur Father      Colon cancer Paternal Grandmother          Allergies  Patient has no known allergies.    ROS: 12 system review was completed and is negative with the exception of those signs and symptoms noted in the history of present illness: A 12 system review was completed and is negative with the exception of those signs and symptoms noted in the history of present illness.     Exam:  General: in NAD, appears stated age  Head: normocephalic, atraumatic  Respiratory: normal effort, no use of accessory muscles  Cardiovascular: no edema noted  Skin: normal turgor, no rashes  Neurologic: grossly intact, oriented " to person/place/time  Psychiatric: mode and affect appropriate     Last Recorded Vitals  Blood pressure 164/82, pulse 64, resp. rate 16, height 1.829 m (6'), weight 129 kg (284 lb 2.8 oz).    Lab Results   Component Value Date    CREATININE 0.98 06/22/2023    HGB 16.1 01/11/2024         ASSESSMENT/PLAN:  # Hypogonadism  -Excellent symptomatic response to testosterone replacement therapy  -Continue testosterone cypionate 100 units weekly, refills given today  -He is having some hypertension, though he attributes this to reduction in some of his BP meds due to side effects.  He is working with Dr. Jaquez on his blood pressure control    # Borderline elevated hematocrit  -We discussed donating blood once every 3 months    Follow-up 6 months with repeat labs    Curry Natarajan MD

## 2024-01-22 ENCOUNTER — OFFICE VISIT (OUTPATIENT)
Dept: PRIMARY CARE | Facility: CLINIC | Age: 49
End: 2024-01-22
Payer: COMMERCIAL

## 2024-01-22 ENCOUNTER — LAB (OUTPATIENT)
Dept: LAB | Facility: LAB | Age: 49
End: 2024-01-22
Payer: COMMERCIAL

## 2024-01-22 ENCOUNTER — PATIENT MESSAGE (OUTPATIENT)
Dept: PRIMARY CARE | Facility: CLINIC | Age: 49
End: 2024-01-22

## 2024-01-22 VITALS
DIASTOLIC BLOOD PRESSURE: 88 MMHG | HEIGHT: 72 IN | HEART RATE: 57 BPM | OXYGEN SATURATION: 98 % | BODY MASS INDEX: 37.93 KG/M2 | RESPIRATION RATE: 18 BRPM | WEIGHT: 280 LBS | TEMPERATURE: 97.4 F | SYSTOLIC BLOOD PRESSURE: 132 MMHG

## 2024-01-22 DIAGNOSIS — G47.33 OSA ON CPAP: ICD-10-CM

## 2024-01-22 DIAGNOSIS — E78.5 HYPERLIPIDEMIA, UNSPECIFIED HYPERLIPIDEMIA TYPE: ICD-10-CM

## 2024-01-22 DIAGNOSIS — E78.1 HYPERTRIGLYCERIDEMIA: ICD-10-CM

## 2024-01-22 DIAGNOSIS — I10 HYPERTENSION, ESSENTIAL, BENIGN: ICD-10-CM

## 2024-01-22 DIAGNOSIS — J45.20 MILD INTERMITTENT ASTHMA WITHOUT COMPLICATION (HHS-HCC): ICD-10-CM

## 2024-01-22 DIAGNOSIS — I10 HYPERTENSION, ESSENTIAL, BENIGN: Primary | ICD-10-CM

## 2024-01-22 LAB
ALBUMIN SERPL BCP-MCNC: 4.4 G/DL (ref 3.4–5)
ALP SERPL-CCNC: 49 U/L (ref 33–120)
ALT SERPL W P-5'-P-CCNC: 22 U/L (ref 10–52)
ANION GAP SERPL CALC-SCNC: 13 MMOL/L (ref 10–20)
AST SERPL W P-5'-P-CCNC: 21 U/L (ref 9–39)
BILIRUB SERPL-MCNC: 0.7 MG/DL (ref 0–1.2)
BUN SERPL-MCNC: 19 MG/DL (ref 6–23)
CALCIUM SERPL-MCNC: 9.5 MG/DL (ref 8.6–10.3)
CHLORIDE SERPL-SCNC: 102 MMOL/L (ref 98–107)
CO2 SERPL-SCNC: 31 MMOL/L (ref 21–32)
CREAT SERPL-MCNC: 1.18 MG/DL (ref 0.5–1.3)
EGFRCR SERPLBLD CKD-EPI 2021: 76 ML/MIN/1.73M*2
GLUCOSE SERPL-MCNC: 89 MG/DL (ref 74–99)
POTASSIUM SERPL-SCNC: 4.4 MMOL/L (ref 3.5–5.3)
PROT SERPL-MCNC: 6.3 G/DL (ref 6.4–8.2)
SODIUM SERPL-SCNC: 142 MMOL/L (ref 136–145)

## 2024-01-22 PROCEDURE — 3074F SYST BP LT 130 MM HG: CPT | Performed by: FAMILY MEDICINE

## 2024-01-22 PROCEDURE — 3008F BODY MASS INDEX DOCD: CPT | Performed by: FAMILY MEDICINE

## 2024-01-22 PROCEDURE — 80053 COMPREHEN METABOLIC PANEL: CPT

## 2024-01-22 PROCEDURE — 36415 COLL VENOUS BLD VENIPUNCTURE: CPT

## 2024-01-22 PROCEDURE — 1036F TOBACCO NON-USER: CPT | Performed by: FAMILY MEDICINE

## 2024-01-22 PROCEDURE — 3078F DIAST BP <80 MM HG: CPT | Performed by: FAMILY MEDICINE

## 2024-01-22 PROCEDURE — 99214 OFFICE O/P EST MOD 30 MIN: CPT | Performed by: FAMILY MEDICINE

## 2024-01-22 RX ORDER — CHLORTHALIDONE 25 MG/1
25 TABLET ORAL DAILY
Qty: 30 TABLET | Refills: 2 | Status: SHIPPED | OUTPATIENT
Start: 2024-01-22 | End: 2024-02-14

## 2024-01-22 RX ORDER — AMLODIPINE BESYLATE 10 MG/1
10 TABLET ORAL DAILY
Qty: 30 TABLET | Refills: 2 | Status: SHIPPED | OUTPATIENT
Start: 2024-01-22 | End: 2024-02-12

## 2024-01-22 ASSESSMENT — ENCOUNTER SYMPTOMS
HYPERTENSION: 1
SHORTNESS OF BREATH: 0
HEADACHES: 0

## 2024-01-22 NOTE — PROGRESS NOTES
Subjective     Darwin Frost is a 48 y.o. male who presents for Hypertension.    Hypertension  Associated symptoms include anxiety. Pertinent negatives include no chest pain, headaches or shortness of breath.      Pt is here for HTN recheck.  He is on amlodipine 5 mg daily, metoprolol XL 25 mg TWO tabs daily, lisinopril 40 mg , and hydrochlorothiazide 25 mg daily.  His home blood pressures are in the 130-140s systolic.    He doesn't feel the metoprolol has helped lower his blood pressures and requests to stop taking it.      He denies LE edema with the 5 mg amlodipine.      Pt has asthma, mild, triggered by URI symptoms.  He uses albuterol as needed.      He has RAJAT, on cpap , he using the positive pressure airway device for more than 4 hours per night and more than 70% of the nights per week.  Patient has noticed significant improvement of symptoms when using his cpap device.       Review of Systems   Respiratory:  Negative for shortness of breath.    Cardiovascular:  Negative for chest pain.   Neurological:  Negative for headaches.       Objective     Vitals:    01/22/24 0810 01/22/24 0834   BP: 123/77 132/88   BP Location: Left arm    Patient Position: Sitting    Pulse: 57    Resp: 18    Temp: 36.3 °C (97.4 °F)    TempSrc: Temporal    SpO2: 98%    Weight: 127 kg (280 lb)    Height: 1.829 m (6')         Current Outpatient Medications   Medication Instructions    albuterol 90 mcg/actuation inhaler inhalation    amLODIPine (NORVASC) 10 mg, oral, Daily    chlorthalidone (HYGROTON) 25 mg, oral, Daily    cholecalciferol (Vitamin D-3) 50 MCG (2000 UT) tablet 2 tablets, oral, Daily    docosahexaenoic acid/epa (FISH OIL ORAL) oral    folic acid (Folvite) 800 mcg tablet 1 tablet, oral, Daily    lisinopril 40 mg, oral, Daily    omeprazole 20 mg tablet,disintegrat, delay rel 1 tablet, oral, Daily    testosterone cypionate (DEPO-TESTOSTERONE) 100 mg, intramuscular, Weekly        Past Surgical History:   Procedure Laterality  Date    OTHER SURGICAL HISTORY  05/28/2021    Vasectomy    OTHER SURGICAL HISTORY  05/24/2022    Colonoscopy        Social History     Tobacco Use    Smoking status: Never    Smokeless tobacco: Never   Vaping Use    Vaping Use: Never used        Family History   Problem Relation Name Age of Onset    Heart murmur Father      Colon cancer Paternal Grandmother          Immunization History   Administered Date(s) Administered    DTP / HiB 01/01/1976    Flu vaccine (IIV4), preservative free *Check age/dose* 11/18/2020, 10/30/2021, 11/01/2022    Hepatitis B vaccine, adult (RECOMBIVAX, ENGERIX) 11/18/2014, 05/26/2015    Influenza, seasonal, injectable 11/18/2014    MMR vaccine, subcutaneous (MMR II) 10/01/1976, 03/01/1977    Moderna SARS-CoV-2 Vaccination 03/24/2021, 04/29/2021, 01/03/2022    Pfizer COVID-19 vaccine, bivalent, age 12 years and older (30 mcg/0.3 mL) 12/01/2022    Pneumococcal conjugate vaccine, 13-valent (PREVNAR 13) 11/18/2014    Td vaccine, age 7 years and older (TDVAX) 01/01/1980    Tdap vaccine, age 7 year and older (BOOSTRIX) 05/03/2007, 11/18/2014, 05/11/2020    Varicella vaccine, subcutaneous (VARIVAX) 12/01/1976        Physical Exam  Vitals reviewed.   Constitutional:       General: He is not in acute distress.     Appearance: Normal appearance. He is well-developed. He is obese.   HENT:      Head: Normocephalic and atraumatic.   Eyes:      General: Lids are normal.      Conjunctiva/sclera:      Right eye: Right conjunctiva is not injected.      Left eye: Left conjunctiva is not injected.   Cardiovascular:      Rate and Rhythm: Normal rate and regular rhythm.      Heart sounds: No murmur heard.  Pulmonary:      Effort: Pulmonary effort is normal. No respiratory distress.      Breath sounds: Normal breath sounds. No wheezing, rhonchi or rales.   Skin:     General: Skin is warm and dry.      Findings: No rash.   Neurological:      Mental Status: He is alert and oriented to person, place, and time.  Mental status is at baseline.   Psychiatric:         Mood and Affect: Mood normal.         Behavior: Behavior normal.         Problem List Items Addressed This Visit       Hypertension, essential, benign - Primary    Relevant Medications    chlorthalidone (Hygroton) 25 mg tablet    amLODIPine (Norvasc) 10 mg tablet    Other Relevant Orders    Comprehensive Metabolic Panel    Comprehensive Metabolic Panel    Mild intermittent asthma without complication    Hypertriglyceridemia    Hyperlipidemia    RAJAT on CPAP       Assessment/Plan     Hypertension, controlled today in office however home bp readings have been high - I will stop his metoprolol and hydrochlorothiazide and increase his amlodipine to 10 mg daily and start him on chlorthalidone 25 mg daily -The goals of therapy, medication dose, frequency and potential side effects were discussed with patient today.  The patient is agreeable to taking the medication as prescribed.   Patient was instructed to record blood pressures (using an arm BP monitor) at home 1-2 times per day (per AHA guidelines) and to follow up in office for a blood pressure recheck in 4-6 weeks.  I also encouraged low-sodium diet and regular exercise.  I also discussed with patient the importance of good blood pressure control to avoid long-term complications such as heart attack and stroke.       kidney ultrasound and renal artery ultrasound were normal     Hypogonadism - treated with testosterone replacement by  urology.      HLD/Hypertrig - improved, recent lipid panel favorable - continue fibrate, statin    RAJAT - on cpap    Obesity - BMI 37 -Healthy diet, routine exercise was discussed with patient      Hx of asthma - controlled    Follow up 1 month

## 2024-01-23 DIAGNOSIS — R79.89 LOW SERUM TOTAL PROTEIN LEVEL: Primary | ICD-10-CM

## 2024-02-10 DIAGNOSIS — I10 HYPERTENSION, ESSENTIAL, BENIGN: ICD-10-CM

## 2024-02-12 RX ORDER — AMLODIPINE BESYLATE 10 MG/1
10 TABLET ORAL DAILY
Qty: 90 TABLET | Refills: 3 | Status: SHIPPED | OUTPATIENT
Start: 2024-02-12

## 2024-02-13 DIAGNOSIS — I10 HYPERTENSION, ESSENTIAL, BENIGN: ICD-10-CM

## 2024-02-14 RX ORDER — CHLORTHALIDONE 25 MG/1
25 TABLET ORAL DAILY
Qty: 90 TABLET | Refills: 3 | Status: SHIPPED | OUTPATIENT
Start: 2024-02-14

## 2024-03-04 ENCOUNTER — OFFICE VISIT (OUTPATIENT)
Dept: PRIMARY CARE | Facility: CLINIC | Age: 49
End: 2024-03-04
Payer: COMMERCIAL

## 2024-03-04 VITALS
SYSTOLIC BLOOD PRESSURE: 124 MMHG | BODY MASS INDEX: 37.65 KG/M2 | WEIGHT: 278 LBS | OXYGEN SATURATION: 98 % | RESPIRATION RATE: 18 BRPM | TEMPERATURE: 97.2 F | HEIGHT: 72 IN | DIASTOLIC BLOOD PRESSURE: 70 MMHG | HEART RATE: 54 BPM

## 2024-03-04 DIAGNOSIS — E66.01 CLASS 2 SEVERE OBESITY DUE TO EXCESS CALORIES WITH SERIOUS COMORBIDITY AND BODY MASS INDEX (BMI) OF 37.0 TO 37.9 IN ADULT (MULTI): ICD-10-CM

## 2024-03-04 DIAGNOSIS — E78.5 HYPERLIPIDEMIA, UNSPECIFIED HYPERLIPIDEMIA TYPE: ICD-10-CM

## 2024-03-04 DIAGNOSIS — I10 HYPERTENSION, ESSENTIAL, BENIGN: Primary | ICD-10-CM

## 2024-03-04 DIAGNOSIS — G47.33 OSA ON CPAP: ICD-10-CM

## 2024-03-04 PROCEDURE — 3078F DIAST BP <80 MM HG: CPT | Performed by: FAMILY MEDICINE

## 2024-03-04 PROCEDURE — 1036F TOBACCO NON-USER: CPT | Performed by: FAMILY MEDICINE

## 2024-03-04 PROCEDURE — 99214 OFFICE O/P EST MOD 30 MIN: CPT | Performed by: FAMILY MEDICINE

## 2024-03-04 PROCEDURE — 3008F BODY MASS INDEX DOCD: CPT | Performed by: FAMILY MEDICINE

## 2024-03-04 PROCEDURE — 3074F SYST BP LT 130 MM HG: CPT | Performed by: FAMILY MEDICINE

## 2024-03-04 ASSESSMENT — ENCOUNTER SYMPTOMS
SHORTNESS OF BREATH: 0
HEADACHES: 0
HYPERTENSION: 1

## 2024-03-04 NOTE — PROGRESS NOTES
Subjective     Darwin Frost is a 48 y.o. male who presents for Hypertension.    Hypertension  Associated symptoms include anxiety. Pertinent negatives include no chest pain, headaches or shortness of breath.        Pt is here for HTN follow up.  He is doing well. He is trying to lose weight.  He sees  urology for low testosterone , on testosterone replacement.      Review of Systems   Respiratory:  Negative for shortness of breath.    Cardiovascular:  Negative for chest pain.   Neurological:  Negative for headaches.       Objective     Vitals:    03/04/24 0826 03/04/24 0848   BP: 133/84 124/70   BP Location: Left arm    Patient Position: Sitting    Pulse: 54    Resp: 18    Temp: 36.2 °C (97.2 °F)    TempSrc: Temporal    SpO2: 98%    Weight: 126 kg (278 lb)    Height: 1.829 m (6')         Current Outpatient Medications   Medication Instructions    albuterol 90 mcg/actuation inhaler inhalation    amLODIPine (NORVASC) 10 mg, oral, Daily    chlorthalidone (HYGROTON) 25 mg, oral, Daily    cholecalciferol (Vitamin D-3) 50 MCG (2000 UT) tablet 2 tablets, oral, Daily    docosahexaenoic acid/epa (FISH OIL ORAL) oral    folic acid (Folvite) 800 mcg tablet 1 tablet, oral, Daily    lisinopril 40 mg, oral, Daily    omeprazole 20 mg tablet,disintegrat, delay rel 1 tablet, oral, Daily    testosterone cypionate (DEPO-TESTOSTERONE) 100 mg, intramuscular, Weekly        Past Surgical History:   Procedure Laterality Date    OTHER SURGICAL HISTORY  05/28/2021    Vasectomy    OTHER SURGICAL HISTORY  05/24/2022    Colonoscopy        Social History     Tobacco Use    Smoking status: Never    Smokeless tobacco: Never   Vaping Use    Vaping Use: Never used        Family History   Problem Relation Name Age of Onset    Heart murmur Father      Colon cancer Paternal Grandmother          Immunization History   Administered Date(s) Administered    DTP / HiB 01/01/1976    Flu vaccine (IIV4), preservative free *Check age/dose* 11/18/2020,  10/30/2021, 11/01/2022    Hepatitis B vaccine, adult (RECOMBIVAX, ENGERIX) 11/18/2014, 05/26/2015    Influenza, seasonal, injectable 11/18/2014    MMR vaccine, subcutaneous (MMR II) 10/01/1976, 03/01/1977    Moderna COVID-19 vaccine, Fall 2023, 12 yeasrs and older (50mcg/0.5mL) 11/29/2023    Moderna SARS-CoV-2 Vaccination 03/24/2021, 04/29/2021, 01/03/2022    Pfizer COVID-19 vaccine, bivalent, age 12 years and older (30 mcg/0.3 mL) 12/01/2022    Pneumococcal conjugate vaccine, 13-valent (PREVNAR 13) 11/18/2014    Td vaccine, age 7 years and older (TDVAX) 01/01/1980    Tdap vaccine, age 7 year and older (BOOSTRIX, ADACEL) 05/03/2007, 11/18/2014, 05/11/2020    Varicella vaccine, subcutaneous (VARIVAX) 12/01/1976        Physical Exam  Vitals reviewed.   Constitutional:       General: He is not in acute distress.     Appearance: Normal appearance. He is well-developed. He is obese.   HENT:      Head: Normocephalic and atraumatic.   Eyes:      General: Lids are normal.      Conjunctiva/sclera:      Right eye: Right conjunctiva is not injected.      Left eye: Left conjunctiva is not injected.   Cardiovascular:      Rate and Rhythm: Normal rate and regular rhythm.      Heart sounds: No murmur heard.  Pulmonary:      Effort: Pulmonary effort is normal. No respiratory distress.      Breath sounds: Normal breath sounds. No wheezing, rhonchi or rales.   Skin:     General: Skin is warm and dry.      Findings: No rash.   Neurological:      Mental Status: He is alert and oriented to person, place, and time. Mental status is at baseline.   Psychiatric:         Mood and Affect: Mood normal.         Behavior: Behavior normal.         Problem List Items Addressed This Visit       Hypertension, essential, benign - Primary    Hyperlipidemia    Class 2 severe obesity due to excess calories with serious comorbidity and body mass index (BMI) of 37.0 to 37.9 in adult (CMS/[facility])    RAJAT on CPAP       Assessment/Plan     Hypertension -  controlled on chlorthalidone, lisinopril, amlodipine.      Hypogonadism - treated with testosterone replacement by  urology.      HLD/Hypertrig - improved, recent lipid panel favorable - continue OTC fish oil.  Pt has never been on statins or fibrates.      RAJAT - on cpap     Obesity - BMI 37 -Healthy diet, routine exercise was discussed with patient       Hx of asthma - controlled    Follow up 3-4 months

## 2024-04-15 DIAGNOSIS — K21.9 GASTROESOPHAGEAL REFLUX DISEASE, UNSPECIFIED WHETHER ESOPHAGITIS PRESENT: Primary | ICD-10-CM

## 2024-04-15 RX ORDER — OMEPRAZOLE 20 MG/1
1 TABLET, ORALLY DISINTEGRATING, DELAYED RELEASE ORAL DAILY
Qty: 30 TABLET | Refills: 3 | Status: SHIPPED | OUTPATIENT
Start: 2024-04-15

## 2024-06-03 ENCOUNTER — APPOINTMENT (OUTPATIENT)
Dept: PRIMARY CARE | Facility: CLINIC | Age: 49
End: 2024-06-03
Payer: COMMERCIAL

## 2024-06-14 DIAGNOSIS — I10 HYPERTENSION, ESSENTIAL, BENIGN: ICD-10-CM

## 2024-07-11 ENCOUNTER — LAB (OUTPATIENT)
Dept: LAB | Facility: LAB | Age: 49
End: 2024-07-11
Payer: COMMERCIAL

## 2024-07-11 ENCOUNTER — APPOINTMENT (OUTPATIENT)
Dept: PRIMARY CARE | Facility: CLINIC | Age: 49
End: 2024-07-11
Payer: COMMERCIAL

## 2024-07-11 VITALS
OXYGEN SATURATION: 98 % | SYSTOLIC BLOOD PRESSURE: 132 MMHG | BODY MASS INDEX: 37.65 KG/M2 | TEMPERATURE: 97.8 F | HEIGHT: 72 IN | RESPIRATION RATE: 18 BRPM | HEART RATE: 57 BPM | WEIGHT: 278 LBS | DIASTOLIC BLOOD PRESSURE: 80 MMHG

## 2024-07-11 DIAGNOSIS — E66.01 CLASS 2 SEVERE OBESITY DUE TO EXCESS CALORIES WITH SERIOUS COMORBIDITY AND BODY MASS INDEX (BMI) OF 37.0 TO 37.9 IN ADULT (MULTI): ICD-10-CM

## 2024-07-11 DIAGNOSIS — G47.33 OSA ON CPAP: ICD-10-CM

## 2024-07-11 DIAGNOSIS — Z00.00 HEALTH CARE MAINTENANCE: ICD-10-CM

## 2024-07-11 DIAGNOSIS — E55.9 VITAMIN D DEFICIENCY: ICD-10-CM

## 2024-07-11 DIAGNOSIS — I10 HYPERTENSION, ESSENTIAL, BENIGN: ICD-10-CM

## 2024-07-11 DIAGNOSIS — Z00.00 HEALTH CARE MAINTENANCE: Primary | ICD-10-CM

## 2024-07-11 DIAGNOSIS — E29.1 HYPOGONADISM IN MALE: ICD-10-CM

## 2024-07-11 DIAGNOSIS — E78.1 HYPERTRIGLYCERIDEMIA: ICD-10-CM

## 2024-07-11 DIAGNOSIS — K21.9 GASTROESOPHAGEAL REFLUX DISEASE, UNSPECIFIED WHETHER ESOPHAGITIS PRESENT: ICD-10-CM

## 2024-07-11 DIAGNOSIS — Z87.01 HX OF RECURRENT PNEUMONIA: ICD-10-CM

## 2024-07-11 LAB
25(OH)D3 SERPL-MCNC: 68 NG/ML (ref 30–100)
ALBUMIN SERPL BCP-MCNC: 4.3 G/DL (ref 3.4–5)
ALP SERPL-CCNC: 42 U/L (ref 33–120)
ALT SERPL W P-5'-P-CCNC: 22 U/L (ref 10–52)
ANION GAP SERPL CALC-SCNC: 13 MMOL/L (ref 10–20)
AST SERPL W P-5'-P-CCNC: 23 U/L (ref 9–39)
BASOPHILS # BLD AUTO: 0.04 X10*3/UL (ref 0–0.1)
BASOPHILS NFR BLD AUTO: 0.7 %
BILIRUB SERPL-MCNC: 0.7 MG/DL (ref 0–1.2)
BUN SERPL-MCNC: 17 MG/DL (ref 6–23)
CALCIUM SERPL-MCNC: 9.7 MG/DL (ref 8.6–10.3)
CHLORIDE SERPL-SCNC: 102 MMOL/L (ref 98–107)
CHOLEST SERPL-MCNC: 186 MG/DL (ref 0–199)
CHOLESTEROL/HDL RATIO: 4.3
CO2 SERPL-SCNC: 30 MMOL/L (ref 21–32)
CREAT SERPL-MCNC: 1.25 MG/DL (ref 0.5–1.3)
EGFRCR SERPLBLD CKD-EPI 2021: 71 ML/MIN/1.73M*2
EOSINOPHIL # BLD AUTO: 0.09 X10*3/UL (ref 0–0.7)
EOSINOPHIL NFR BLD AUTO: 1.6 %
ERYTHROCYTE [DISTWIDTH] IN BLOOD BY AUTOMATED COUNT: 13.4 % (ref 11.5–14.5)
EST. AVERAGE GLUCOSE BLD GHB EST-MCNC: 105 MG/DL
GLUCOSE SERPL-MCNC: 88 MG/DL (ref 74–99)
HBA1C MFR BLD: 5.3 %
HCT VFR BLD AUTO: 49.1 % (ref 41–52)
HDLC SERPL-MCNC: 43.5 MG/DL
HGB BLD-MCNC: 15.8 G/DL (ref 13.5–17.5)
IMM GRANULOCYTES # BLD AUTO: 0.02 X10*3/UL (ref 0–0.7)
IMM GRANULOCYTES NFR BLD AUTO: 0.3 % (ref 0–0.9)
LDLC SERPL CALC-MCNC: 124 MG/DL
LYMPHOCYTES # BLD AUTO: 1.45 X10*3/UL (ref 1.2–4.8)
LYMPHOCYTES NFR BLD AUTO: 25 %
MCH RBC QN AUTO: 26.7 PG (ref 26–34)
MCHC RBC AUTO-ENTMCNC: 32.2 G/DL (ref 32–36)
MCV RBC AUTO: 83 FL (ref 80–100)
MONOCYTES # BLD AUTO: 0.58 X10*3/UL (ref 0.1–1)
MONOCYTES NFR BLD AUTO: 10 %
NEUTROPHILS # BLD AUTO: 3.62 X10*3/UL (ref 1.2–7.7)
NEUTROPHILS NFR BLD AUTO: 62.4 %
NON HDL CHOLESTEROL: 143 MG/DL (ref 0–149)
NRBC BLD-RTO: 0 /100 WBCS (ref 0–0)
PLATELET # BLD AUTO: 273 X10*3/UL (ref 150–450)
POC APPEARANCE, URINE: CLEAR
POC BILIRUBIN, URINE: NEGATIVE
POC BLOOD, URINE: NEGATIVE
POC COLOR, URINE: YELLOW
POC GLUCOSE, URINE: NEGATIVE MG/DL
POC KETONES, URINE: NEGATIVE MG/DL
POC LEUKOCYTES, URINE: NEGATIVE
POC NITRITE,URINE: NEGATIVE
POC PH, URINE: 6.5 PH
POC PROTEIN, URINE: NEGATIVE MG/DL
POC SPECIFIC GRAVITY, URINE: 1.01
POC UROBILINOGEN, URINE: 0.2 EU/DL
POTASSIUM SERPL-SCNC: 3.8 MMOL/L (ref 3.5–5.3)
PROT SERPL-MCNC: 6.3 G/DL (ref 6.4–8.2)
RBC # BLD AUTO: 5.91 X10*6/UL (ref 4.5–5.9)
SODIUM SERPL-SCNC: 141 MMOL/L (ref 136–145)
TESTOST SERPL-MCNC: 685 NG/DL (ref 240–1000)
TRIGL SERPL-MCNC: 91 MG/DL (ref 0–149)
VLDL: 18 MG/DL (ref 0–40)
WBC # BLD AUTO: 5.8 X10*3/UL (ref 4.4–11.3)

## 2024-07-11 PROCEDURE — 82306 VITAMIN D 25 HYDROXY: CPT

## 2024-07-11 PROCEDURE — 80061 LIPID PANEL: CPT

## 2024-07-11 PROCEDURE — 85025 COMPLETE CBC W/AUTO DIFF WBC: CPT

## 2024-07-11 PROCEDURE — 83036 HEMOGLOBIN GLYCOSYLATED A1C: CPT

## 2024-07-11 PROCEDURE — 80053 COMPREHEN METABOLIC PANEL: CPT

## 2024-07-11 PROCEDURE — 36415 COLL VENOUS BLD VENIPUNCTURE: CPT

## 2024-07-11 PROCEDURE — 84403 ASSAY OF TOTAL TESTOSTERONE: CPT

## 2024-07-11 ASSESSMENT — ENCOUNTER SYMPTOMS
HEADACHES: 0
SHORTNESS OF BREATH: 0

## 2024-07-11 ASSESSMENT — PATIENT HEALTH QUESTIONNAIRE - PHQ9
2. FEELING DOWN, DEPRESSED OR HOPELESS: NOT AT ALL
SUM OF ALL RESPONSES TO PHQ9 QUESTIONS 1 AND 2: 0
1. LITTLE INTEREST OR PLEASURE IN DOING THINGS: NOT AT ALL

## 2024-07-11 NOTE — PROGRESS NOTES
Subjective     Darwin Frost is a 48 y.o. male who presents for Annual Exam.    HPI     Pt is doing well.  He is here for his annual exam.  He is physically active.  He is doing cross fit five days a weeki.       He has HTN , on amlodipine, chlorthalidone and lisinopril.  He checks bp at home, 120s/70s.  Patient denies chest pain, shortness of breath, dizziness, headaches or vision changes.     He has RAJAT , on cpap.   Patient is using the positive pressure airway device (CPAP) for more than 4 hours per night and more than 70% of the nights per week.  Patient reports significant improvement in sleep.    He has GERD, well controlled on PPI daily.  No GERD symtoms.     Review of Systems   Respiratory:  Negative for shortness of breath.    Cardiovascular:  Negative for chest pain.   Neurological:  Negative for headaches.       Objective     Vitals:    07/11/24 0802 07/11/24 0825 07/11/24 0826   BP: 147/81 138/88 132/80   BP Location: Right arm     Patient Position: Sitting     Pulse: 57     Resp: 18     Temp: 36.6 °C (97.8 °F)     TempSrc: Temporal     SpO2: 98%     Weight: 126 kg (278 lb)     Height: 1.829 m (6')          Current Outpatient Medications   Medication Instructions    albuterol 90 mcg/actuation inhaler inhalation    amLODIPine (NORVASC) 10 mg, oral, Daily    chlorthalidone (HYGROTON) 25 mg, oral, Daily    cholecalciferol (Vitamin D-3) 50 MCG (2000 UT) tablet 2 tablets, oral, Daily    docosahexaenoic acid/epa (FISH OIL ORAL) oral    folic acid (Folvite) 800 mcg tablet 1 tablet, oral, Daily    lisinopril 40 mg, oral, Daily    omeprazole 20 mg tablet,disintegrat, delay rel 1 tablet, oral, Daily    testosterone cypionate (DEPO-TESTOSTERONE) 100 mg, intramuscular, Once Weekly        No Known Allergies     Past Surgical History:   Procedure Laterality Date    OTHER SURGICAL HISTORY  05/28/2021    Vasectomy    OTHER SURGICAL HISTORY  05/24/2022    Colonoscopy        Social History     Tobacco Use    Smoking status:  Never    Smokeless tobacco: Never   Vaping Use    Vaping status: Never Used        Social History     Substance and Sexual Activity   Alcohol Use None       Family History   Problem Relation Name Age of Onset    Heart murmur Father      Colon cancer Paternal Grandmother          Immunization History   Administered Date(s) Administered    DTP / HiB 01/01/1976    Flu vaccine (IIV4), preservative free *Check age/dose* 11/18/2020, 10/30/2021, 11/01/2022, 11/29/2023    Hepatitis B vaccine, adult *Check Product/Dose* 11/18/2014, 05/26/2015    Influenza, seasonal, injectable 11/18/2014    MMR vaccine, subcutaneous (MMR II) 10/01/1976, 03/01/1977    Moderna COVID-19 vaccine, Fall 2023, 12 yeasrs and older (50mcg/0.5mL) 11/29/2023    Moderna SARS-CoV-2 Vaccination 03/24/2021, 04/29/2021, 01/03/2022    Pfizer COVID-19 vaccine, bivalent, age 12 years and older (30 mcg/0.3 mL) 12/01/2022    Pneumococcal conjugate vaccine, 13-valent (PREVNAR 13) 11/18/2014    Pneumococcal conjugate vaccine, 20-valent (PREVNAR 20) 07/11/2024    Td vaccine, age 7 years and older (TDVAX) 01/01/1980    Tdap vaccine, age 7 year and older (BOOSTRIX, ADACEL) 05/03/2007, 11/18/2014, 05/11/2020    Varicella vaccine, subcutaneous (VARIVAX) 12/01/1976        Physical Exam  Vitals reviewed.   Constitutional:       General: He is not in acute distress.     Appearance: Normal appearance. He is well-developed. He is obese.   HENT:      Head: Normocephalic and atraumatic.   Eyes:      General: Lids are normal.      Conjunctiva/sclera:      Right eye: Right conjunctiva is not injected.      Left eye: Left conjunctiva is not injected.   Cardiovascular:      Rate and Rhythm: Normal rate and regular rhythm.      Heart sounds: No murmur heard.  Pulmonary:      Effort: Pulmonary effort is normal. No respiratory distress.      Breath sounds: Normal breath sounds. No wheezing, rhonchi or rales.   Skin:     General: Skin is warm and dry.      Findings: No rash.    Neurological:      Mental Status: He is alert and oriented to person, place, and time. Mental status is at baseline.   Psychiatric:         Mood and Affect: Mood normal.         Behavior: Behavior normal.         Problem List Items Addressed This Visit       Health care maintenance - Primary    Relevant Orders    POCT UA (nonautomated) manually resulted (Completed)    ECG 12 lead (Clinic Performed) (Completed)    Comprehensive Metabolic Panel    Lipid Panel (Completed)    CBC and Auto Differential (Completed)    Hemoglobin A1C (Completed)    Hypertension, essential, benign    Relevant Orders    Comprehensive Metabolic Panel    CBC and Auto Differential (Completed)    Vitamin D deficiency    Relevant Orders    Vitamin D 25-Hydroxy,Total (for eval of Vitamin D levels) (Completed)    Hypertriglyceridemia    Relevant Orders    Comprehensive Metabolic Panel    Lipid Panel (Completed)    GERD (gastroesophageal reflux disease)    Class 2 severe obesity due to excess calories with serious comorbidity and body mass index (BMI) of 37.0 to 37.9 in adult (Multi)    Relevant Orders    Hemoglobin A1C (Completed)    RAJAT on CPAP     Other Visit Diagnoses       Hx of recurrent pneumonia        Relevant Orders    Pneumococcal conjugate vaccine, 20-valent (PREVNAR 20) (Completed)            Assessment/Plan     Well Exam      Colon screening - utd with colonoscopy 9/20/21-repeat 5 years      Vaccines - utd with tdap.     Hx of recurrent pneumonia - prevnar 20 given today      I recommend yearly flu vaccine and routine COVID vaccinations as indicated      Complete labs     Hypertension - fair control on chlorthalidone, lisinopril, amlodipine.  Home bp readings are in good range 120/70s.     Hypogonadism - treated with testosterone replacement by  urology.      HLD/Hypertrig - continue OTC fish oil.  Pt has never been on statins or fibrates.  Recheck lipids     RAJAT - on cpap      Obesity - BMI 37 -Healthy diet, routine exercise was  discussed with patient       Hx of asthma - controlled    Follow up in 4-6 months for recheck on HTN, weight.

## 2024-07-12 RX ORDER — CHLORTHALIDONE 25 MG/1
25 TABLET ORAL DAILY
Qty: 90 TABLET | Refills: 3 | Status: SHIPPED | OUTPATIENT
Start: 2024-07-12

## 2024-07-15 DIAGNOSIS — I10 HYPERTENSION, ESSENTIAL, BENIGN: ICD-10-CM

## 2024-07-15 RX ORDER — AMLODIPINE BESYLATE 10 MG/1
10 TABLET ORAL DAILY
Qty: 90 TABLET | Refills: 3 | Status: SHIPPED | OUTPATIENT
Start: 2024-07-15

## 2024-07-18 ENCOUNTER — APPOINTMENT (OUTPATIENT)
Dept: UROLOGY | Facility: CLINIC | Age: 49
End: 2024-07-18
Payer: COMMERCIAL

## 2024-07-25 ENCOUNTER — OFFICE VISIT (OUTPATIENT)
Dept: UROLOGY | Facility: CLINIC | Age: 49
End: 2024-07-25
Payer: COMMERCIAL

## 2024-07-25 VITALS
WEIGHT: 278.8 LBS | HEIGHT: 72 IN | BODY MASS INDEX: 37.76 KG/M2 | DIASTOLIC BLOOD PRESSURE: 83 MMHG | HEART RATE: 69 BPM | RESPIRATION RATE: 16 BRPM | SYSTOLIC BLOOD PRESSURE: 145 MMHG

## 2024-07-25 DIAGNOSIS — E29.1 HYPOGONADISM IN MALE: ICD-10-CM

## 2024-07-25 PROCEDURE — 99215 OFFICE O/P EST HI 40 MIN: CPT | Performed by: UROLOGY

## 2024-07-25 PROCEDURE — 3008F BODY MASS INDEX DOCD: CPT | Performed by: UROLOGY

## 2024-07-25 PROCEDURE — 3077F SYST BP >= 140 MM HG: CPT | Performed by: UROLOGY

## 2024-07-25 PROCEDURE — 3079F DIAST BP 80-89 MM HG: CPT | Performed by: UROLOGY

## 2024-07-25 PROCEDURE — G2211 COMPLEX E/M VISIT ADD ON: HCPCS | Performed by: UROLOGY

## 2024-07-25 PROCEDURE — 1036F TOBACCO NON-USER: CPT | Performed by: UROLOGY

## 2024-07-25 ASSESSMENT — PAIN SCALES - GENERAL: PAINLEVEL: 0-NO PAIN

## 2024-07-25 NOTE — PROGRESS NOTES
"PRIOR NOTES  48-year-old male referred for evaluation of low testosterone  PMH: RAJAT on CPAP, hypertension, hyperlipidemia  6/22/2023-testosterone at 9:38  ng/dL  Hemoglobin 14  Pt endorses: Depression, excessive fatigue  Sexual: Good erectile function, libido is okay, \"lowish\"  Non-sexual: fatigue, depression. Having difficulty w/ weight loss and muscle gain despite weightlifting and exercising x 1 yr  Using CPAP  Prior vasectomy  No prior chemotherapy, mumps, frequent hot tub use  No recreational drug use, or opioid use     10/05/23 - Pt reports better stamina, recovery, focus. No adverse affects he is aware of. Injecting thighs.   T 9/29/23 - 719  PSA 8/11/23 - 0.66  LH 2.6     1/11/24 - T 781 (end of cycle)  Hgb 16.1  7/11/2024 - 685, Hgb 15.8 - 100u weekly    UPDATED SUBJECTIVE HISTORY  07/25/24 -doing great on testosterone therapy.  Working out regularly, losing weight gaining muscle mass.  He is very happy with his results    Past Medical History  He has a past medical history of Essential (primary) hypertension.    Surgical History  He has a past surgical history that includes Other surgical history (05/28/2021) and Other surgical history (05/24/2022).     Social History  He reports that he has never smoked. He has never used smokeless tobacco. No history on file for alcohol use and drug use.    Family History  Family History   Problem Relation Name Age of Onset    Heart murmur Father      Colon cancer Paternal Grandmother          Allergies  Patient has no known allergies.    ROS: 12 system review was completed and is negative with the exception of those signs and symptoms noted in the history of present illness: A 12 system review was completed and is negative with the exception of those signs and symptoms noted in the history of present illness.     Exam:  General: in NAD, appears stated age  Head: normocephalic, atraumatic  Respiratory: normal effort, no use of accessory muscles  Cardiovascular: no " edema noted  Skin: normal turgor, no rashes  Neurologic: grossly intact, oriented to person/place/time  Psychiatric: mode and affect appropriate     Last Recorded Vitals  Blood pressure 145/83, pulse 69, resp. rate 16, height 1.829 m (6'), weight 126 kg (278 lb 12.8 oz).    Lab Results   Component Value Date    CREATININE 1.25 07/11/2024    HGB 15.8 07/11/2024         ASSESSMENT/PLAN:  # Hypogonadism  -Continue testosterone cypionate 100 units weekly, he is not having any issues with supply, syringes, or injections  -I did mention autoinjector if he grows wary of injecting himself, but at this point he has had such a great serum response I hesitate to change anything  -Check testosterone, CBC, PSA in 6 months, follow-up at that time    Curry Natarajan MD

## 2024-09-10 DIAGNOSIS — I10 HYPERTENSION, ESSENTIAL, BENIGN: ICD-10-CM

## 2024-09-10 RX ORDER — LISINOPRIL 40 MG/1
40 TABLET ORAL DAILY
Qty: 90 TABLET | Refills: 3 | Status: SHIPPED | OUTPATIENT
Start: 2024-09-10

## 2024-09-12 ENCOUNTER — APPOINTMENT (OUTPATIENT)
Dept: PRIMARY CARE | Facility: CLINIC | Age: 49
End: 2024-09-12
Payer: COMMERCIAL

## 2024-09-12 VITALS
OXYGEN SATURATION: 95 % | DIASTOLIC BLOOD PRESSURE: 77 MMHG | HEART RATE: 68 BPM | TEMPERATURE: 98 F | BODY MASS INDEX: 37.11 KG/M2 | HEIGHT: 72 IN | WEIGHT: 274 LBS | RESPIRATION RATE: 18 BRPM | SYSTOLIC BLOOD PRESSURE: 131 MMHG

## 2024-09-12 DIAGNOSIS — B36.0 TINEA VERSICOLOR: Primary | ICD-10-CM

## 2024-09-12 DIAGNOSIS — E29.1 HYPOGONADISM IN MALE: ICD-10-CM

## 2024-09-12 DIAGNOSIS — Z23 INFLUENZA VACCINE ADMINISTERED: ICD-10-CM

## 2024-09-12 PROCEDURE — 3008F BODY MASS INDEX DOCD: CPT | Performed by: FAMILY MEDICINE

## 2024-09-12 PROCEDURE — 3078F DIAST BP <80 MM HG: CPT | Performed by: FAMILY MEDICINE

## 2024-09-12 PROCEDURE — 1036F TOBACCO NON-USER: CPT | Performed by: FAMILY MEDICINE

## 2024-09-12 PROCEDURE — 90656 IIV3 VACC NO PRSV 0.5 ML IM: CPT | Performed by: FAMILY MEDICINE

## 2024-09-12 PROCEDURE — 90471 IMMUNIZATION ADMIN: CPT | Performed by: FAMILY MEDICINE

## 2024-09-12 PROCEDURE — 99213 OFFICE O/P EST LOW 20 MIN: CPT | Performed by: FAMILY MEDICINE

## 2024-09-12 PROCEDURE — 3075F SYST BP GE 130 - 139MM HG: CPT | Performed by: FAMILY MEDICINE

## 2024-09-12 RX ORDER — KETOCONAZOLE 20 MG/G
CREAM TOPICAL
Qty: 30 G | Refills: 0 | Status: SHIPPED | OUTPATIENT
Start: 2024-09-12

## 2024-09-12 RX ORDER — TESTOSTERONE CYPIONATE 200 MG/ML
100 INJECTION, SOLUTION INTRAMUSCULAR
Qty: 10 ML | Refills: 3 | Status: SHIPPED | OUTPATIENT
Start: 2024-09-15

## 2024-09-12 RX ORDER — KETOCONAZOLE 20 MG/ML
SHAMPOO, SUSPENSION TOPICAL DAILY
Qty: 120 ML | Refills: 0 | Status: SHIPPED | OUTPATIENT
Start: 2024-09-12

## 2024-09-12 ASSESSMENT — ENCOUNTER SYMPTOMS
FEVER: 0
CHILLS: 0

## 2024-09-12 NOTE — PROGRESS NOTES
Subjective     Darwin Frost is a 49 y.o. male who presents for Rash.    Rash  Pertinent negatives include no fever.        Pt reports pink colored rash on his back which was noticed by his wife one month ago.  The rash has been itchy.  No fevers.  Patient denies any new soaps, lotions, detergents or medications.      Pt reports having a similar rash on his chest in the past.       Review of Systems   Constitutional:  Negative for chills and fever.   Skin:  Positive for rash.       Objective     Vitals:    09/12/24 1130   BP: 131/77   BP Location: Left arm   Patient Position: Sitting   Pulse: 68   Resp: 18   Temp: 36.7 °C (98 °F)   TempSrc: Temporal   SpO2: 95%   Weight: 124 kg (274 lb)   Height: 1.829 m (6')        Current Outpatient Medications   Medication Instructions    albuterol 90 mcg/actuation inhaler inhalation    amLODIPine (NORVASC) 10 mg, oral, Daily    chlorthalidone (HYGROTON) 25 mg, oral, Daily    cholecalciferol (Vitamin D-3) 50 MCG (2000 UT) tablet 2 tablets, oral, Daily    docosahexaenoic acid/epa (FISH OIL ORAL) oral    folic acid (Folvite) 800 mcg tablet 1 tablet, oral, Daily    ketoconazole (NIZOral) 2 % cream Apply sparingly to affected area twice daily    ketoconazole (NIZOral) 2 % shampoo Topical, Daily, Shampoo daily 14 days, leave on for 5-10 minutes, then rinse.    lisinopril 40 mg, oral, Daily    omeprazole 20 mg tablet,disintegrat, delay rel 1 tablet, oral, Daily    testosterone cypionate (DEPO-TESTOSTERONE) 100 mg, intramuscular, Once Weekly        No Known Allergies     Past Surgical History:   Procedure Laterality Date    OTHER SURGICAL HISTORY  05/28/2021    Vasectomy    OTHER SURGICAL HISTORY  05/24/2022    Colonoscopy        Social History     Tobacco Use    Smoking status: Never    Smokeless tobacco: Never   Vaping Use    Vaping status: Never Used        Family History   Problem Relation Name Age of Onset    Heart murmur Father      Colon cancer Paternal Grandmother           Immunization History   Administered Date(s) Administered    DTP / HiB 01/01/1976    Flu vaccine (IIV4), preservative free *Check age/dose* 11/18/2020, 10/30/2021, 11/01/2022, 11/29/2023    Flu vaccine, trivalent, preservative free, age 6 months and greater (Fluarix/Fluzone/Flulaval) 09/12/2024    Hepatitis B vaccine, adult *Check Product/Dose* 11/18/2014, 05/26/2015    Influenza, seasonal, injectable 11/18/2014    MMR vaccine, subcutaneous (MMR II) 10/01/1976, 03/01/1977    Moderna COVID-19 vaccine, Fall 2023, 12 yeasrs and older (50mcg/0.5mL) 11/29/2023    Moderna SARS-CoV-2 Vaccination 03/24/2021, 04/29/2021, 01/03/2022    PPD Test 05/03/2007    Pfizer COVID-19 vaccine, bivalent, age 12 years and older (30 mcg/0.3 mL) 12/01/2022    Pneumococcal conjugate vaccine, 13-valent (PREVNAR 13) 11/18/2014    Pneumococcal conjugate vaccine, 20-valent (PREVNAR 20) 07/11/2024    Td vaccine, age 7 years and older (TDVAX) 01/01/1980    Tdap vaccine, age 7 year and older (BOOSTRIX, ADACEL) 05/03/2007, 11/18/2014, 05/11/2020    Varicella vaccine, subcutaneous (VARIVAX) 12/01/1976        Physical Exam  Vitals reviewed.   Constitutional:       General: He is not in acute distress.     Appearance: Normal appearance. He is well-developed.   HENT:      Head: Normocephalic and atraumatic.   Eyes:      General: Lids are normal.      Conjunctiva/sclera:      Right eye: Right conjunctiva is not injected.      Left eye: Left conjunctiva is not injected.   Skin:     General: Skin is warm and dry.      Findings: Rash (salmon colored circular flat scattered rash on upper/mid back.) present.   Neurological:      Mental Status: He is alert and oriented to person, place, and time. Mental status is at baseline.   Psychiatric:         Mood and Affect: Mood normal.         Behavior: Behavior normal.         Assessment & Plan  Tinea versicolor  Discussed treatment options.  Pt advised to try the ketoconazole shampoo daily x 14 days.  He also  wants to have the ketoconazole cream on hand.  Follow up if no improvement or if symptoms worsen.      Orders:    ketoconazole (NIZOral) 2 % shampoo; Apply topically once daily. Shampoo daily 14 days, leave on for 5-10 minutes, then rinse.    ketoconazole (NIZOral) 2 % cream; Apply sparingly to affected area twice daily    Influenza vaccine administered    Orders:    Flu vaccine, trivalent, preservative free, age 6 months and greater (Fluraix/Fluzone/Flulaval)

## 2024-10-09 DIAGNOSIS — B36.0 TINEA VERSICOLOR: Primary | ICD-10-CM

## 2024-10-09 RX ORDER — KETOCONAZOLE 20 MG/ML
SHAMPOO, SUSPENSION TOPICAL DAILY
Qty: 120 ML | Refills: 0 | Status: SHIPPED | OUTPATIENT
Start: 2024-10-09

## 2025-01-10 ENCOUNTER — PATIENT MESSAGE (OUTPATIENT)
Dept: UROLOGY | Facility: CLINIC | Age: 50
End: 2025-01-10
Payer: COMMERCIAL

## 2025-01-17 ENCOUNTER — LAB (OUTPATIENT)
Dept: LAB | Facility: LAB | Age: 50
End: 2025-01-17
Payer: COMMERCIAL

## 2025-01-17 DIAGNOSIS — E29.1 HYPOGONADISM IN MALE: ICD-10-CM

## 2025-01-17 LAB
ERYTHROCYTE [DISTWIDTH] IN BLOOD BY AUTOMATED COUNT: 13.5 % (ref 11.5–14.5)
HCT VFR BLD AUTO: 49.2 % (ref 41–52)
HGB BLD-MCNC: 16.2 G/DL (ref 13.5–17.5)
MCH RBC QN AUTO: 26.9 PG (ref 26–34)
MCHC RBC AUTO-ENTMCNC: 32.9 G/DL (ref 32–36)
MCV RBC AUTO: 82 FL (ref 80–100)
NRBC BLD-RTO: 0 /100 WBCS (ref 0–0)
PLATELET # BLD AUTO: 281 X10*3/UL (ref 150–450)
PSA SERPL-MCNC: 1.11 NG/ML
RBC # BLD AUTO: 6.03 X10*6/UL (ref 4.5–5.9)
TESTOST SERPL-MCNC: 572 NG/DL (ref 240–1000)
WBC # BLD AUTO: 5.8 X10*3/UL (ref 4.4–11.3)

## 2025-01-17 PROCEDURE — 84403 ASSAY OF TOTAL TESTOSTERONE: CPT

## 2025-01-17 PROCEDURE — 85027 COMPLETE CBC AUTOMATED: CPT

## 2025-01-17 PROCEDURE — 84153 ASSAY OF PSA TOTAL: CPT

## 2025-01-23 ENCOUNTER — APPOINTMENT (OUTPATIENT)
Dept: UROLOGY | Facility: CLINIC | Age: 50
End: 2025-01-23
Payer: COMMERCIAL

## 2025-01-23 DIAGNOSIS — E29.1 HYPOGONADISM IN MALE: ICD-10-CM

## 2025-01-23 PROCEDURE — 99214 OFFICE O/P EST MOD 30 MIN: CPT | Performed by: UROLOGY

## 2025-01-23 PROCEDURE — 1036F TOBACCO NON-USER: CPT | Performed by: UROLOGY

## 2025-01-23 PROCEDURE — G2211 COMPLEX E/M VISIT ADD ON: HCPCS | Performed by: UROLOGY

## 2025-01-23 NOTE — PROGRESS NOTES
"PRIOR NOTES  48-year-old male referred for evaluation of low testosterone  PMH: RAJAT on CPAP, hypertension, hyperlipidemia  6/22/2023-testosterone at 9:38  ng/dL  Hemoglobin 14  Pt endorses: Depression, excessive fatigue  Sexual: Good erectile function, libido is okay, \"lowish\"  Non-sexual: fatigue, depression. Having difficulty w/ weight loss and muscle gain despite weightlifting and exercising x 1 yr  Using CPAP  Prior vasectomy  No prior chemotherapy, mumps, frequent hot tub use  No recreational drug use, or opioid use     TRT  08/2023 - initiated TRT, Tcyp 100u weekly  - T 9/29/23 - 719  - PSA 8/11/23 - 0.66  LH 2.6  - 1/11/24 - T 781 (end of cycle)  Hgb 16.1  - 7/11/2024 - 685, Hgb 15.8 - 100u weekly  - 1/17/25 - PSA 1.11, T 572, Hgb 16.2    UPDATED SUBJECTIVE HISTORY  01/23/25 -patient states his benefit is perhaps a little bit less than it was previously but still better than baseline.  No issues with injections.  Having some trouble finding syringes at his pharmacy    Past Medical History  He has a past medical history of Essential (primary) hypertension.    Surgical History  He has a past surgical history that includes Other surgical history (05/28/2021) and Other surgical history (05/24/2022).     Social History  He reports that he has never smoked. He has never used smokeless tobacco. No history on file for alcohol use and drug use.    Family History  Family History   Problem Relation Name Age of Onset    Heart murmur Father      Colon cancer Paternal Grandmother          Allergies  Patient has no known allergies.    ROS: 12 system review was completed and is negative with the exception of those signs and symptoms noted in the history of present illness: A 12 system review was completed and is negative with the exception of those signs and symptoms noted in the history of present illness.     Exam:  General: in NAD, appears stated age  Head: normocephalic, atraumatic  Respiratory: normal effort, no " use of accessory muscles  Cardiovascular: no edema noted  Skin: normal turgor, no rashes  Neurologic: grossly intact, oriented to person/place/time  Psychiatric: mode and affect appropriate    Conducted virtually     Last Recorded Vitals  There were no vitals taken for this visit.    Lab Results   Component Value Date    CREATININE 1.25 07/11/2024    HGB 16.2 01/17/2025         ASSESSMENT/PLAN:  # Hypogonadism  -Continues to do well on testosterone replacement therapy, testosterone cypionate 100 units weekly  -I advised that he get his syringes from a third-party rather than the pharmacy for a more reliable and consistent syringe type I can help him get a more accurate dose  -He will look on [employer]  -Follow-up in 6 months with testosterone, CBC    Curry Natarajan MD

## 2025-04-04 ENCOUNTER — OFFICE VISIT (OUTPATIENT)
Dept: PRIMARY CARE | Facility: CLINIC | Age: 50
End: 2025-04-04
Payer: COMMERCIAL

## 2025-04-04 VITALS
RESPIRATION RATE: 16 BRPM | WEIGHT: 278 LBS | SYSTOLIC BLOOD PRESSURE: 132 MMHG | BODY MASS INDEX: 37.7 KG/M2 | OXYGEN SATURATION: 98 % | HEART RATE: 80 BPM | DIASTOLIC BLOOD PRESSURE: 80 MMHG | TEMPERATURE: 99.5 F

## 2025-04-04 DIAGNOSIS — R09.81 NASAL CONGESTION: Primary | ICD-10-CM

## 2025-04-04 DIAGNOSIS — E29.1 HYPOGONADISM IN MALE: ICD-10-CM

## 2025-04-04 PROCEDURE — 3079F DIAST BP 80-89 MM HG: CPT | Performed by: NURSE PRACTITIONER

## 2025-04-04 PROCEDURE — 3075F SYST BP GE 130 - 139MM HG: CPT | Performed by: NURSE PRACTITIONER

## 2025-04-04 PROCEDURE — 99213 OFFICE O/P EST LOW 20 MIN: CPT | Performed by: NURSE PRACTITIONER

## 2025-04-04 PROCEDURE — 1036F TOBACCO NON-USER: CPT | Performed by: NURSE PRACTITIONER

## 2025-04-04 ASSESSMENT — ENCOUNTER SYMPTOMS
APPETITE CHANGE: 0
SINUS PRESSURE: 0
ACTIVITY CHANGE: 0
DIARRHEA: 0
NAUSEA: 0
VOMITING: 0
RHINORRHEA: 1
CONSTIPATION: 0
SLEEP DISTURBANCE: 0
SINUS PAIN: 0
COUGH: 1
FEVER: 0
HEADACHES: 1
SORE THROAT: 0

## 2025-04-04 NOTE — PROGRESS NOTES
Subjective   Patient ID: Darwin Frost is a 49 y.o. male who presents for Cough.    Cold symptoms x4 days  Started on Monday w/fatigue  Got worse on Wednesday  Headaches  Nasal congestion  Nasal drainage  Post nasal drip  Cough- productive  No fever or chills  Body aches      OTC- vit.c / zinc    Cough  Episode onset: Monday. The cough is Productive of sputum. Associated symptoms include headaches, nasal congestion, postnasal drip and rhinorrhea. Pertinent negatives include no ear pain, fever or sore throat.        Review of Systems   Constitutional:  Negative for activity change, appetite change and fever.   HENT:  Positive for congestion, postnasal drip and rhinorrhea. Negative for ear pain, sinus pressure, sinus pain and sore throat.    Respiratory:  Positive for cough.    Gastrointestinal:  Negative for constipation, diarrhea, nausea and vomiting.   Neurological:  Positive for headaches.   Psychiatric/Behavioral:  Negative for sleep disturbance.        Objective   /80   Pulse 80   Temp 37.5 °C (99.5 °F)   Resp 16   Wt 126 kg (278 lb)   SpO2 98%   BMI 37.70 kg/m²     Physical Exam  Vitals reviewed.   Constitutional:       General: He is awake. He is not in acute distress.     Appearance: Normal appearance. He is well-developed and well-groomed. He is not ill-appearing or toxic-appearing.   HENT:      Head: Normocephalic.      Right Ear: Tympanic membrane, ear canal and external ear normal.      Left Ear: Tympanic membrane, ear canal and external ear normal.      Nose: Mucosal edema and congestion present.      Right Turbinates: Swollen.      Left Turbinates: Swollen.      Mouth/Throat:      Lips: Pink.      Mouth: Mucous membranes are moist.      Pharynx: Posterior oropharyngeal erythema and postnasal drip present.   Eyes:      Extraocular Movements: Extraocular movements intact.      Conjunctiva/sclera: Conjunctivae normal.      Pupils: Pupils are equal, round, and reactive to light.   Cardiovascular:       Rate and Rhythm: Normal rate and regular rhythm.      Pulses: Normal pulses.      Heart sounds: Normal heart sounds.   Pulmonary:      Effort: Pulmonary effort is normal.      Breath sounds: Normal breath sounds.   Musculoskeletal:      Cervical back: Normal range of motion and neck supple.   Skin:     General: Skin is warm.      Capillary Refill: Capillary refill takes less than 2 seconds.   Neurological:      General: No focal deficit present.      Mental Status: He is alert and oriented to person, place, and time.   Psychiatric:         Mood and Affect: Mood normal.         Behavior: Behavior normal. Behavior is cooperative.         Assessment/Plan   Diagnoses and all orders for this visit:  Nasal congestion    Declines IO testing  Discussed viral vs bacterial infections  Encouraged daily use of Flonase and Zyrtec for symptom support  Can use Tylenol or Motrin as needed for fever or pain  Increase hydration  Follow up with PCP if not improving over the next 2-3 days   ER for any SOB, difficulty breathing, uncontrolled fevers or worsening of symptoms

## 2025-04-08 DIAGNOSIS — E29.1 HYPOGONADISM IN MALE: ICD-10-CM

## 2025-04-08 RX ORDER — TESTOSTERONE CYPIONATE 200 MG/ML
100 INJECTION, SOLUTION INTRAMUSCULAR
Qty: 2 ML | OUTPATIENT
Start: 2025-04-08

## 2025-04-08 RX ORDER — TESTOSTERONE CYPIONATE 200 MG/ML
100 INJECTION, SOLUTION INTRAMUSCULAR
Qty: 10 ML | Refills: 3 | Status: SHIPPED | OUTPATIENT
Start: 2025-04-08

## 2025-07-06 DIAGNOSIS — I10 HYPERTENSION, ESSENTIAL, BENIGN: ICD-10-CM

## 2025-07-07 RX ORDER — AMLODIPINE BESYLATE 10 MG/1
10 TABLET ORAL DAILY
Qty: 90 TABLET | Refills: 0 | Status: SHIPPED | OUTPATIENT
Start: 2025-07-07

## 2025-08-26 DIAGNOSIS — I10 HYPERTENSION, ESSENTIAL, BENIGN: ICD-10-CM

## 2025-08-26 RX ORDER — CHLORTHALIDONE 25 MG/1
25 TABLET ORAL DAILY
Qty: 30 TABLET | Refills: 0 | Status: SHIPPED | OUTPATIENT
Start: 2025-08-26

## 2025-08-26 RX ORDER — LISINOPRIL 40 MG/1
40 TABLET ORAL DAILY
Qty: 30 TABLET | Refills: 0 | Status: SHIPPED | OUTPATIENT
Start: 2025-08-26

## 2025-12-02 ENCOUNTER — APPOINTMENT (OUTPATIENT)
Facility: CLINIC | Age: 50
End: 2025-12-02
Payer: COMMERCIAL